# Patient Record
Sex: FEMALE | Race: BLACK OR AFRICAN AMERICAN | NOT HISPANIC OR LATINO | Employment: OTHER | URBAN - METROPOLITAN AREA
[De-identification: names, ages, dates, MRNs, and addresses within clinical notes are randomized per-mention and may not be internally consistent; named-entity substitution may affect disease eponyms.]

---

## 2017-01-13 ENCOUNTER — HISTORICAL (OUTPATIENT)
Dept: RADIOLOGY | Facility: HOSPITAL | Age: 52
End: 2017-01-13

## 2017-03-14 ENCOUNTER — HISTORICAL (OUTPATIENT)
Dept: ADMINISTRATIVE | Facility: HOSPITAL | Age: 52
End: 2017-03-14

## 2017-03-24 ENCOUNTER — HISTORICAL (OUTPATIENT)
Dept: ADMINISTRATIVE | Facility: HOSPITAL | Age: 52
End: 2017-03-24

## 2017-04-13 ENCOUNTER — HISTORICAL (OUTPATIENT)
Dept: ADMINISTRATIVE | Facility: HOSPITAL | Age: 52
End: 2017-04-13

## 2017-05-09 ENCOUNTER — HISTORICAL (OUTPATIENT)
Dept: ADMINISTRATIVE | Facility: HOSPITAL | Age: 52
End: 2017-05-09

## 2017-05-09 LAB
ABS NEUT (OLG): 3.52 X10(3)/MCL (ref 2.1–9.2)
ALBUMIN SERPL-MCNC: 3.8 GM/DL (ref 3.4–5)
ALBUMIN/GLOB SERPL: 1 RATIO (ref 1–2)
ALP SERPL-CCNC: 97 UNIT/L (ref 20–120)
ALT SERPL-CCNC: 20 UNIT/L
AST SERPL-CCNC: 27 UNIT/L
BASOPHILS # BLD AUTO: 0.04 X10(3)/MCL
BASOPHILS NFR BLD AUTO: 1 % (ref 0–1)
BILIRUB SERPL-MCNC: 0.7 MG/DL
BILIRUBIN DIRECT+TOT PNL SERPL-MCNC: 0.1 MG/DL
BILIRUBIN DIRECT+TOT PNL SERPL-MCNC: 0.6 MG/DL
BUN SERPL-MCNC: 16 MG/DL (ref 7–25)
CALCIUM SERPL-MCNC: 9 MG/DL (ref 8.4–10.3)
CHLORIDE SERPL-SCNC: 105 MMOL/L (ref 96–110)
CO2 SERPL-SCNC: 28 MMOL/L (ref 24–32)
CREAT SERPL-MCNC: 0.62 MG/DL (ref 0.7–1.1)
EOSINOPHIL # BLD AUTO: 0.19 X10(3)/MCL
EOSINOPHIL NFR BLD AUTO: 3 % (ref 0–5)
ERYTHROCYTE [DISTWIDTH] IN BLOOD BY AUTOMATED COUNT: 17.4 % (ref 11.5–14.5)
GLOBULIN SER-MCNC: 4.4 GM/ML (ref 2.3–3.5)
GLUCOSE SERPL-MCNC: 91 MG/DL (ref 65–99)
HCT VFR BLD AUTO: 34.8 % (ref 35–46)
HGB BLD-MCNC: 10.4 GM/DL (ref 12–16)
IMM GRANULOCYTES # BLD AUTO: 0.02 10*3/UL
IMM GRANULOCYTES NFR BLD AUTO: 0 %
LYMPHOCYTES # BLD AUTO: 2.62 X10(3)/MCL
LYMPHOCYTES NFR BLD AUTO: 36 % (ref 15–40)
MCH RBC QN AUTO: 19.5 PG (ref 26–34)
MCHC RBC AUTO-ENTMCNC: 29.9 GM/DL (ref 31–37)
MCV RBC AUTO: 65.3 FL (ref 80–100)
MONOCYTES # BLD AUTO: 0.83 X10(3)/MCL
MONOCYTES NFR BLD AUTO: 12 % (ref 4–12)
NEUTROPHILS # BLD AUTO: 3.52 X10(3)/MCL
NEUTROPHILS NFR BLD AUTO: 49 X10(3)/MCL
PLATELET # BLD AUTO: 314 X10(3)/MCL (ref 130–400)
PMV BLD AUTO: 9.3 FL (ref 7.4–10.4)
POTASSIUM SERPL-SCNC: 3.6 MMOL/L (ref 3.6–5.2)
PROT SERPL-MCNC: 8.2 GM/DL (ref 6–8)
RBC # BLD AUTO: 5.33 X10(6)/MCL (ref 4–5.2)
SODIUM SERPL-SCNC: 138 MMOL/L (ref 135–146)
WBC # SPEC AUTO: 7.2 X10(3)/MCL (ref 4.5–11)

## 2017-07-27 LAB — RAPID GROUP A STREP (OHS): NEGATIVE

## 2017-08-09 ENCOUNTER — HISTORICAL (OUTPATIENT)
Dept: ADMINISTRATIVE | Facility: HOSPITAL | Age: 52
End: 2017-08-09

## 2017-08-09 LAB
HBV SURFACE AB SER-ACNC: 334.95 MIU/ML
HBV SURFACE AB SERPL IA-ACNC: REACTIVE M[IU]/ML

## 2017-08-15 ENCOUNTER — HISTORICAL (OUTPATIENT)
Dept: RADIOLOGY | Facility: HOSPITAL | Age: 52
End: 2017-08-15

## 2017-08-23 ENCOUNTER — HISTORICAL (OUTPATIENT)
Dept: RADIOLOGY | Facility: HOSPITAL | Age: 52
End: 2017-08-23

## 2018-05-03 ENCOUNTER — HISTORICAL (OUTPATIENT)
Dept: RADIOLOGY | Facility: HOSPITAL | Age: 53
End: 2018-05-03

## 2019-04-19 ENCOUNTER — HISTORICAL (OUTPATIENT)
Dept: ADMINISTRATIVE | Facility: HOSPITAL | Age: 54
End: 2019-04-19

## 2019-04-26 ENCOUNTER — HISTORICAL (OUTPATIENT)
Dept: ADMINISTRATIVE | Facility: HOSPITAL | Age: 54
End: 2019-04-26

## 2019-04-26 LAB — HIV 1+2 AB+HIV1 P24 AG SERPL QL IA: NONREACTIVE

## 2019-06-13 ENCOUNTER — HISTORICAL (OUTPATIENT)
Dept: LAB | Facility: HOSPITAL | Age: 54
End: 2019-06-13

## 2019-06-13 LAB
ABS NEUT (OLG): 3.26 X10(3)/MCL (ref 2.1–9.2)
ALBUMIN SERPL-MCNC: 3.8 GM/DL (ref 3.4–5)
ALBUMIN/GLOB SERPL: 1 {RATIO}
ALP SERPL-CCNC: 111 UNIT/L (ref 45–117)
ALT SERPL-CCNC: 18 UNIT/L (ref 13–56)
ANISOCYTOSIS BLD QL SMEAR: ABNORMAL
AST SERPL-CCNC: 17 UNIT/L (ref 15–37)
BILIRUB SERPL-MCNC: 0.8 MG/DL (ref 0.2–1)
BILIRUBIN DIRECT+TOT PNL SERPL-MCNC: 0.23 MG/DL (ref 0–0.2)
BILIRUBIN DIRECT+TOT PNL SERPL-MCNC: 0.57 MG/DL (ref 0–1)
BUN SERPL-MCNC: 12 MG/DL (ref 7–18)
CALCIUM SERPL-MCNC: 8.9 MG/DL (ref 8.5–10.1)
CHLORIDE SERPL-SCNC: 108 MMOL/L (ref 98–107)
CHOLEST SERPL-MCNC: 140 MG/DL (ref 0–199)
CHOLEST/HDLC SERPL: 3 MG/DL (ref 0–8)
CO2 SERPL-SCNC: 26 MMOL/L (ref 21–32)
CREAT SERPL-MCNC: 0.84 MG/DL (ref 0.55–1.02)
ERYTHROCYTE [DISTWIDTH] IN BLOOD BY AUTOMATED COUNT: 17.2 % (ref 11.5–17)
GLOBULIN SER-MCNC: 4 GM/DL (ref 2–4)
GLUCOSE SERPL-MCNC: 92 MG/DL (ref 74–106)
HCT VFR BLD AUTO: 39.7 % (ref 37–47)
HDLC SERPL-MCNC: 44 MG/DL
HGB BLD-MCNC: 12.3 GM/DL (ref 12–16)
HYPOCHROMIA BLD QL SMEAR: ABNORMAL
LDLC SERPL CALC-MCNC: 83 MG/DL (ref 0–129)
MCH RBC QN AUTO: 22.4 PG (ref 27–31)
MCHC RBC AUTO-ENTMCNC: 31 GM/DL (ref 33–36)
MCV RBC AUTO: 72.4 FL (ref 80–94)
MICROCYTES BLD QL SMEAR: ABNORMAL
PLATELET # BLD AUTO: 327 X10(3)/MCL (ref 130–400)
PLATELET # BLD EST: ADEQUATE 10*3/UL
PMV BLD AUTO: 9.7 FL (ref 7.4–10.4)
POTASSIUM SERPL-SCNC: 3.8 MMOL/L (ref 3.5–5.1)
PROT SERPL-MCNC: 8.3 GM/DL (ref 6.4–8.2)
RBC # BLD AUTO: 5.48 X10(6)/MCL (ref 4.2–5.4)
RBC MORPH BLD: ABNORMAL
SODIUM SERPL-SCNC: 142 MMOL/L (ref 136–145)
TRIGL SERPL-MCNC: 63 MG/DL (ref 0–149)
TSH SERPL-ACNC: 0.74 MIU/ML (ref 0.36–3.74)
VLDLC SERPL CALC-MCNC: 13 MG/DL
WBC # SPEC AUTO: 7.1 X10(3)/MCL (ref 4.5–11.5)

## 2021-02-19 ENCOUNTER — HISTORICAL (OUTPATIENT)
Dept: LAB | Facility: HOSPITAL | Age: 56
End: 2021-02-19

## 2021-02-19 LAB
ABS NEUT (OLG): 2.58 X10(3)/MCL (ref 2.1–9.2)
ALBUMIN SERPL-MCNC: 4.1 GM/DL (ref 3.5–5)
ALBUMIN/GLOB SERPL: 1.1 RATIO (ref 1.1–2)
ALP SERPL-CCNC: 95 UNIT/L (ref 40–150)
ALT SERPL-CCNC: 10 UNIT/L (ref 0–55)
AST SERPL-CCNC: 15 UNIT/L (ref 5–34)
BASOPHILS # BLD AUTO: 0 X10(3)/MCL (ref 0–0.2)
BASOPHILS NFR BLD AUTO: 0 %
BILIRUB SERPL-MCNC: 0.8 MG/DL
BILIRUBIN DIRECT+TOT PNL SERPL-MCNC: 0.2 MG/DL (ref 0–0.5)
BILIRUBIN DIRECT+TOT PNL SERPL-MCNC: 0.6 MG/DL (ref 0–0.8)
BUN SERPL-MCNC: 13.6 MG/DL (ref 9.8–20.1)
CALCIUM SERPL-MCNC: 9.7 MG/DL (ref 8.4–10.2)
CHLORIDE SERPL-SCNC: 102 MMOL/L (ref 98–107)
CHOLEST SERPL-MCNC: 205 MG/DL
CHOLEST/HDLC SERPL: 3 {RATIO} (ref 0–5)
CO2 SERPL-SCNC: 32 MMOL/L (ref 22–29)
CREAT SERPL-MCNC: 0.74 MG/DL (ref 0.55–1.02)
DEPRECATED CALCIDIOL+CALCIFEROL SERPL-MC: 42.2 NG/ML (ref 30–80)
EOSINOPHIL # BLD AUTO: 0.2 X10(3)/MCL (ref 0–0.9)
EOSINOPHIL NFR BLD AUTO: 3 %
ERYTHROCYTE [DISTWIDTH] IN BLOOD BY AUTOMATED COUNT: 14.9 % (ref 11.5–17)
FERRITIN SERPL-MCNC: 8.7 NG/ML (ref 4.63–204)
GLOBULIN SER-MCNC: 3.7 GM/DL (ref 2.4–3.5)
GLUCOSE SERPL-MCNC: 89 MG/DL (ref 74–100)
HCT VFR BLD AUTO: 45.4 % (ref 37–47)
HDLC SERPL-MCNC: 62 MG/DL (ref 35–60)
HGB BLD-MCNC: 13.7 GM/DL (ref 12–16)
IMM GRANULOCYTES # BLD AUTO: 0.01 % (ref 0–0.02)
IMM GRANULOCYTES NFR BLD AUTO: 0.2 % (ref 0–0.43)
IRON SATN MFR SERPL: 16 % (ref 20–50)
IRON SERPL-MCNC: 59 UG/DL (ref 50–170)
LDLC SERPL CALC-MCNC: 125 MG/DL (ref 50–140)
LYMPHOCYTES # BLD AUTO: 1.9 X10(3)/MCL (ref 0.6–4.6)
LYMPHOCYTES NFR BLD AUTO: 36 %
MCH RBC QN AUTO: 25.9 PG (ref 27–31)
MCHC RBC AUTO-ENTMCNC: 30.2 GM/DL (ref 33–36)
MCV RBC AUTO: 86 FL (ref 80–94)
MONOCYTES # BLD AUTO: 0.6 X10(3)/MCL (ref 0.1–1.3)
MONOCYTES NFR BLD AUTO: 12 %
NEUTROPHILS # BLD AUTO: 2.58 X10(3)/MCL (ref 1.4–7.9)
NEUTROPHILS NFR BLD AUTO: 49 %
PLATELET # BLD AUTO: 281 X10(3)/MCL (ref 130–400)
PMV BLD AUTO: 10 FL (ref 9.4–12.4)
POTASSIUM SERPL-SCNC: 4 MMOL/L (ref 3.5–5.1)
PROT SERPL-MCNC: 7.8 GM/DL (ref 6.4–8.3)
RBC # BLD AUTO: 5.28 X10(6)/MCL (ref 4.2–5.4)
SODIUM SERPL-SCNC: 141 MMOL/L (ref 136–145)
TIBC SERPL-MCNC: 313 UG/DL (ref 70–310)
TIBC SERPL-MCNC: 372 UG/DL (ref 250–450)
TRANSFERRIN SERPL-MCNC: 314 MG/DL (ref 180–382)
TRIGL SERPL-MCNC: 91 MG/DL (ref 37–140)
VIT B12 SERPL-MCNC: 467 PG/ML (ref 213–816)
VLDLC SERPL CALC-MCNC: 18 MG/DL
WBC # SPEC AUTO: 5.3 X10(3)/MCL (ref 4.5–11.5)

## 2021-03-09 ENCOUNTER — HISTORICAL (OUTPATIENT)
Dept: LAB | Facility: HOSPITAL | Age: 56
End: 2021-03-09

## 2021-03-09 LAB
FT4I SERPL CALC-MCNC: 2.83 (ref 2.6–3.6)
T3RU NFR SERPL: 30.7 % (ref 31–39)
T4 SERPL-MCNC: 9.21 UG/DL (ref 4.87–11.72)
TSH SERPL-ACNC: 0.75 UIU/ML (ref 0.35–4.94)

## 2021-04-27 ENCOUNTER — HISTORICAL (OUTPATIENT)
Dept: LAB | Facility: HOSPITAL | Age: 56
End: 2021-04-27

## 2021-04-27 LAB
CALCIUM SERPL-MCNC: 9.5 MG/DL (ref 8.4–10.2)
DEPRECATED CALCIDIOL+CALCIFEROL SERPL-MC: 72.3 NG/ML (ref 30–80)

## 2021-08-02 ENCOUNTER — HISTORICAL (OUTPATIENT)
Dept: ADMINISTRATIVE | Facility: HOSPITAL | Age: 56
End: 2021-08-02

## 2021-08-02 LAB
SARS-COV-2 RNA RESP QL NAA+PROBE: NEGATIVE
SARS-COV-2 RNA RESP QL NAA+PROBE: NOT DETECTED

## 2021-08-03 LAB
ABS NEUT (OLG): 2.77 X10(3)/MCL (ref 2.1–9.2)
BASOPHILS # BLD AUTO: 0 X10(3)/MCL (ref 0–0.2)
BASOPHILS NFR BLD AUTO: 0 %
BUN SERPL-MCNC: 13 MG/DL (ref 9.8–20.1)
CALCIUM SERPL-MCNC: 9.3 MG/DL (ref 8.4–10.2)
CHLORIDE SERPL-SCNC: 103 MMOL/L (ref 98–107)
CO2 SERPL-SCNC: 29 MMOL/L (ref 22–29)
CREAT SERPL-MCNC: 0.73 MG/DL (ref 0.55–1.02)
CREAT/UREA NIT SERPL: 18
EOSINOPHIL # BLD AUTO: 0.1 X10(3)/MCL (ref 0–0.9)
EOSINOPHIL NFR BLD AUTO: 2 %
ERYTHROCYTE [DISTWIDTH] IN BLOOD BY AUTOMATED COUNT: 15.6 % (ref 11.5–17)
GLUCOSE SERPL-MCNC: 92 MG/DL (ref 74–100)
HCT VFR BLD AUTO: 42.7 % (ref 37–47)
HGB BLD-MCNC: 13.1 GM/DL (ref 12–16)
LYMPHOCYTES # BLD AUTO: 2.6 X10(3)/MCL (ref 0.6–4.6)
LYMPHOCYTES NFR BLD AUTO: 42 %
MCH RBC QN AUTO: 25.9 PG (ref 27–31)
MCHC RBC AUTO-ENTMCNC: 30.7 GM/DL (ref 33–36)
MCV RBC AUTO: 84.4 FL (ref 80–94)
MONOCYTES # BLD AUTO: 0.6 X10(3)/MCL (ref 0.1–1.3)
MONOCYTES NFR BLD AUTO: 10 %
NEUTROPHILS # BLD AUTO: 2.77 X10(3)/MCL (ref 2.1–9.2)
NEUTROPHILS NFR BLD AUTO: 45 %
PLATELET # BLD AUTO: 290 X10(3)/MCL (ref 130–400)
PMV BLD AUTO: 10.6 FL (ref 9.4–12.4)
POTASSIUM SERPL-SCNC: 3.9 MMOL/L (ref 3.5–5.1)
RBC # BLD AUTO: 5.06 X10(6)/MCL (ref 4.2–5.4)
SODIUM SERPL-SCNC: 144 MMOL/L (ref 136–145)
WBC # SPEC AUTO: 6.2 X10(3)/MCL (ref 4.5–11.5)

## 2021-08-04 ENCOUNTER — HISTORICAL (OUTPATIENT)
Dept: SURGERY | Facility: HOSPITAL | Age: 56
End: 2021-08-04

## 2021-11-30 ENCOUNTER — HISTORICAL (OUTPATIENT)
Dept: RADIOLOGY | Facility: HOSPITAL | Age: 56
End: 2021-11-30

## 2022-04-12 ENCOUNTER — HISTORICAL (OUTPATIENT)
Dept: ADMINISTRATIVE | Facility: HOSPITAL | Age: 57
End: 2022-04-12

## 2022-04-30 VITALS
WEIGHT: 180.31 LBS | HEIGHT: 65 IN | DIASTOLIC BLOOD PRESSURE: 88 MMHG | SYSTOLIC BLOOD PRESSURE: 144 MMHG | BODY MASS INDEX: 30.04 KG/M2 | OXYGEN SATURATION: 100 %

## 2022-09-22 ENCOUNTER — HISTORICAL (OUTPATIENT)
Dept: ADMINISTRATIVE | Facility: HOSPITAL | Age: 57
End: 2022-09-22

## 2022-10-17 ENCOUNTER — APPOINTMENT (OUTPATIENT)
Dept: LAB | Facility: HOSPITAL | Age: 57
End: 2022-10-17
Attending: INTERNAL MEDICINE
Payer: MEDICARE

## 2022-10-20 DIAGNOSIS — M25.532 LEFT WRIST PAIN: Primary | ICD-10-CM

## 2022-10-24 ENCOUNTER — HOSPITAL ENCOUNTER (OUTPATIENT)
Dept: RADIOLOGY | Facility: CLINIC | Age: 57
Discharge: HOME | End: 2022-10-24
Attending: STUDENT IN AN ORGANIZED HEALTH CARE EDUCATION/TRAINING PROGRAM
Payer: MEDICARE

## 2022-10-24 ENCOUNTER — OFFICE VISIT (OUTPATIENT)
Dept: ORTHOPEDICS | Facility: CLINIC | Age: 57
End: 2022-10-24
Payer: MEDICARE

## 2022-10-24 VITALS
HEART RATE: 69 BPM | HEIGHT: 64 IN | SYSTOLIC BLOOD PRESSURE: 135 MMHG | WEIGHT: 194.38 LBS | BODY MASS INDEX: 33.19 KG/M2 | DIASTOLIC BLOOD PRESSURE: 83 MMHG

## 2022-10-24 DIAGNOSIS — M25.532 LEFT WRIST PAIN: ICD-10-CM

## 2022-10-24 DIAGNOSIS — G56.02 LEFT CARPAL TUNNEL SYNDROME: ICD-10-CM

## 2022-10-24 DIAGNOSIS — M65.312 TRIGGER THUMB OF LEFT HAND: ICD-10-CM

## 2022-10-24 DIAGNOSIS — M65.4 DE QUERVAIN'S TENOSYNOVITIS, LEFT: ICD-10-CM

## 2022-10-24 DIAGNOSIS — M25.532 LEFT WRIST PAIN: Primary | ICD-10-CM

## 2022-10-24 PROCEDURE — 99203 OFFICE O/P NEW LOW 30 MIN: CPT | Mod: 25,,, | Performed by: STUDENT IN AN ORGANIZED HEALTH CARE EDUCATION/TRAINING PROGRAM

## 2022-10-24 PROCEDURE — 73110 XR WRIST COMPLETE 3 VIEWS LEFT: ICD-10-PCS | Mod: LT,,, | Performed by: STUDENT IN AN ORGANIZED HEALTH CARE EDUCATION/TRAINING PROGRAM

## 2022-10-24 PROCEDURE — 20550 TENDON SHEATH: ICD-10-PCS | Mod: LT,,, | Performed by: STUDENT IN AN ORGANIZED HEALTH CARE EDUCATION/TRAINING PROGRAM

## 2022-10-24 PROCEDURE — 20550 NJX 1 TENDON SHEATH/LIGAMENT: CPT | Mod: LT,,, | Performed by: STUDENT IN AN ORGANIZED HEALTH CARE EDUCATION/TRAINING PROGRAM

## 2022-10-24 PROCEDURE — 73110 X-RAY EXAM OF WRIST: CPT | Mod: LT,,, | Performed by: STUDENT IN AN ORGANIZED HEALTH CARE EDUCATION/TRAINING PROGRAM

## 2022-10-24 PROCEDURE — 99203 PR OFFICE/OUTPT VISIT, NEW, LEVL III, 30-44 MIN: ICD-10-PCS | Mod: 25,,, | Performed by: STUDENT IN AN ORGANIZED HEALTH CARE EDUCATION/TRAINING PROGRAM

## 2022-10-24 RX ORDER — FLUTICASONE PROPIONATE 50 MCG
SPRAY, SUSPENSION (ML) NASAL
COMMUNITY
Start: 2022-06-24

## 2022-10-24 RX ORDER — OMEPRAZOLE 20 MG/1
20 CAPSULE, DELAYED RELEASE ORAL
COMMUNITY

## 2022-10-24 RX ORDER — LOSARTAN POTASSIUM AND HYDROCHLOROTHIAZIDE 12.5; 5 MG/1; MG/1
TABLET ORAL
COMMUNITY
Start: 2022-10-04

## 2022-10-24 RX ORDER — PHENTERMINE HYDROCHLORIDE 37.5 MG/1
37.5 TABLET ORAL
COMMUNITY
Start: 2022-09-14 | End: 2022-12-13

## 2022-10-24 RX ORDER — HYDROCODONE BITARTRATE AND ACETAMINOPHEN 10; 325 MG/1; MG/1
TABLET ORAL
COMMUNITY
Start: 2022-10-04

## 2022-10-24 RX ORDER — LIDOCAINE HYDROCHLORIDE 10 MG/ML
1 INJECTION INFILTRATION; PERINEURAL
Status: DISCONTINUED | OUTPATIENT
Start: 2022-10-24 | End: 2022-10-24 | Stop reason: HOSPADM

## 2022-10-24 RX ORDER — DICLOFENAC SODIUM 10 MG/G
GEL TOPICAL
COMMUNITY
Start: 2022-09-13

## 2022-10-24 RX ORDER — LANOLIN ALCOHOL/MO/W.PET/CERES
1000 CREAM (GRAM) TOPICAL
COMMUNITY

## 2022-10-24 RX ORDER — FAMOTIDINE 40 MG/1
40 TABLET, FILM COATED ORAL
COMMUNITY
Start: 2021-08-01

## 2022-10-24 RX ORDER — CETIRIZINE HYDROCHLORIDE 10 MG/1
10 TABLET ORAL DAILY
COMMUNITY
Start: 2022-06-24

## 2022-10-24 RX ORDER — BETAMETHASONE SODIUM PHOSPHATE AND BETAMETHASONE ACETATE 3; 3 MG/ML; MG/ML
6 INJECTION, SUSPENSION INTRA-ARTICULAR; INTRALESIONAL; INTRAMUSCULAR; SOFT TISSUE
Status: DISCONTINUED | OUTPATIENT
Start: 2022-10-24 | End: 2022-10-24 | Stop reason: HOSPADM

## 2022-10-24 RX ADMIN — LIDOCAINE HYDROCHLORIDE 1 ML: 10 INJECTION INFILTRATION; PERINEURAL at 02:10

## 2022-10-24 RX ADMIN — BETAMETHASONE SODIUM PHOSPHATE AND BETAMETHASONE ACETATE 6 MG: 3; 3 INJECTION, SUSPENSION INTRA-ARTICULAR; INTRALESIONAL; INTRAMUSCULAR; SOFT TISSUE at 02:10

## 2022-10-24 NOTE — PROCEDURES
Tendon Sheath    Date/Time: 10/24/2022 2:00 PM  Performed by: Dimas Mejia MD  Authorized by: Dimas Mejia MD     Consent Done?:  Yes (Verbal)  Indications:  Pain  Timeout: prior to procedure the correct patient, procedure, and site was verified    Location:  Thumb  Site:  L thumb flexor tendon sheath  Ultrasonic guidance for needle placement?: No    Needle size:  25 G  Approach:  Volar  Medications:  1 mL LIDOcaine HCL 10 mg/ml (1%) 10 mg/mL (1 %)  Patient tolerance:  Patient tolerated the procedure well with no immediate complications  Tendon Sheath    Date/Time: 10/24/2022 2:00 PM  Performed by: Dimas Mejia MD  Authorized by: Dimas Mejia MD     Consent Done?:  Yes (Verbal)  Indications:  Pain  Timeout: prior to procedure the correct patient, procedure, and site was verified    Location:  Wrist  Site:  L first doral compartment  Needle size:  25 G  Approach:  Radial  Medications:  1 mL LIDOcaine HCL 10 mg/ml (1%) 10 mg/mL (1 %); 6 mg betamethasone acetate-betamethasone sodium phosphate 6 mg/mL  Patient tolerance:  Patient tolerated the procedure well with no immediate complications

## 2022-10-24 NOTE — PROGRESS NOTES
Chief Complaint:  Left wrist and thumb pain    Consulting Physician: Juan Luis Alford, DO    History of present illness:    Patient is a 57-year-old female on disability here for initial evaluation for left wrist and thumb pain.  She states she has had this pain for 2 months.  She was seen in urgent care where she was given a wrist brace which did not help her.  She states that is worse at nighttime.  She also has numbness and tingling in the thumb index and middle fingers.  She states that is when she tries to bend the thumb sometimes it locks and catches on her.  She also has pain with wrist motion.  She localizes the wrist pain to the radial styloid.  It occasionally radiates proximally and distally.  She has never had an injection for these.  She denies any weakness or clumsiness in the hand.    Past Medical History:   Diagnosis Date    Hypertension        History reviewed. No pertinent surgical history.    Current Outpatient Medications   Medication Sig    cetirizine (ZYRTEC) 10 MG tablet Take 10 mg by mouth once daily.    cyanocobalamin (VITAMIN B-12) 1000 MCG tablet Take 1,000 mcg by mouth.    diclofenac sodium (VOLTAREN) 1 % Gel     famotidine (PEPCID) 40 MG tablet Take 40 mg by mouth.    fluticasone propionate (FLONASE) 50 mcg/actuation nasal spray INSTILL ONE SPRAY INTO EACH NOSTRIL ONCE A DAY    HYDROcodone-acetaminophen (NORCO)  mg per tablet     losartan-hydrochlorothiazide 50-12.5 mg (HYZAAR) 50-12.5 mg per tablet     omeprazole (PRILOSEC) 20 MG capsule Take 20 mg by mouth.    phentermine (ADIPEX-P) 37.5 mg tablet Take 37.5 mg by mouth.     No current facility-administered medications for this visit.       Review of patient's allergies indicates:   Allergen Reactions    Fig Swelling       Family History   Problem Relation Age of Onset    No Known Problems Mother     No Known Problems Father     No Known Problems Sister     No Known Problems Brother     No Known Problems Maternal Aunt     No  "Known Problems Maternal Uncle     No Known Problems Paternal Aunt     No Known Problems Paternal Uncle     No Known Problems Maternal Grandmother     No Known Problems Maternal Grandfather     No Known Problems Paternal Grandmother     No Known Problems Paternal Grandfather     No Known Problems Other     Anesthesia problems Neg Hx     Broken bones Neg Hx     Cancer Neg Hx     Clotting disorder Neg Hx     Collagen disease Neg Hx     Diabetes Neg Hx     Dislocations Neg Hx     Osteoporosis Neg Hx     Rheumatologic disease Neg Hx     Scoliosis Neg Hx     Severe sprains Neg Hx        Social History     Socioeconomic History    Marital status:    Tobacco Use    Smoking status: Never    Smokeless tobacco: Never   Substance and Sexual Activity    Alcohol use: Never    Drug use: Never       Review of Systems:    Constitution:   Denies chills, fever, and sweats.  HENT:   Denies headaches or blurry vision.  Cardiovascular:  Denies chest pain or irregular heart beat.  Respiratory:   Denies cough or shortness of breath.  Gastrointestinal:  Denies abdominal pain, nausea, or vomiting.  Musculoskeletal:   Denies muscle cramps.  Neurological:   Denies dizziness or focal weakness.  Psychiatric/Behavior: Normal mental status.  Hematology/Lymph:  Denies bleeding problem or easy bruising/bleeding.  Skin:    Denies rash or suspicious lesions.    Examination:    Vital Signs:    Vitals:    10/24/22 1348 10/24/22 1349   BP: 135/83    Pulse: 69    Weight: 88.2 kg (194 lb 6.4 oz)    Height: 5' 4" (1.626 m)    PainSc: 10-Worst pain ever 10-Worst pain ever       Body mass index is 33.37 kg/m².    Constitution:   Well-developed, well nourished patient in no acute distress.  Neurological:   Alert and oriented x 3 and cooperative to examination.     Psychiatric/Behavior: Normal mental status.  Respiratory:   No shortness of breath.  Eyes:    Extraoccular muscles intact  Skin:    No scars, rash or suspicious lesions.    MSK:   Left hand " and wrist:  No open wounds or rashes.  Swelling over the radial styloid.  Tenderness to palpation of radial styloid.  Finkelstein's test is positive.  Tenderness to palpation over the A1 pulley of the thumb.  She has full passive range of motion of the thumb but on active range of motion there is a catch.  She has diminished sensation in the thumb index and long fingers compared to the ring and small fingers.  Tinel over the median nerve at the carpal tunnel is positive.  Durkan's test is positive.  Sensation light touch intact to ulnar and radial distributions.  Hand is warm well perfused    Imaging:   X-ray of the left hand shows degenerative changes but no fractures or dislocations     Assessment:  Left carpal tunnel syndrome, left de Quervain tenosynovitis, left trigger thumb    Plan:  Regarding the left de Quervain tenosynovitis and left trigger thumb, I will treat this conservatively.  I will give her injections into the tendon sheaths of both of these today.  For the left carpal tunnel syndrome, I will order a EMG and nerve conduction study to better evaluate for compressive neuropathy at the elbow and wrist.    Follow Up:  After EMG  Xray at next visit:  None

## 2023-10-26 ENCOUNTER — OFFICE VISIT (OUTPATIENT)
Dept: URGENT CARE | Facility: CLINIC | Age: 58
End: 2023-10-26
Payer: MEDICARE

## 2023-10-26 VITALS
BODY MASS INDEX: 33 KG/M2 | TEMPERATURE: 98 F | WEIGHT: 193.31 LBS | RESPIRATION RATE: 18 BRPM | HEIGHT: 64 IN | HEART RATE: 60 BPM | SYSTOLIC BLOOD PRESSURE: 124 MMHG | OXYGEN SATURATION: 100 % | DIASTOLIC BLOOD PRESSURE: 84 MMHG

## 2023-10-26 DIAGNOSIS — R10.9 FLANK PAIN: ICD-10-CM

## 2023-10-26 DIAGNOSIS — M54.50 ACUTE LEFT-SIDED LOW BACK PAIN WITHOUT SCIATICA: Primary | ICD-10-CM

## 2023-10-26 LAB
BILIRUB UR QL STRIP: NEGATIVE
GLUCOSE UR QL STRIP: NEGATIVE
KETONES UR QL STRIP: POSITIVE
LEUKOCYTE ESTERASE UR QL STRIP: NEGATIVE
PH, POC UA: 7
POC BLOOD, URINE: NEGATIVE
POC NITRATES, URINE: NEGATIVE
PROT UR QL STRIP: NEGATIVE
SP GR UR STRIP: 1.02 (ref 1–1.03)
UROBILINOGEN UR STRIP-ACNC: 1 (ref 0.1–1.1)

## 2023-10-26 PROCEDURE — 99215 OFFICE O/P EST HI 40 MIN: CPT | Mod: PBBFAC | Performed by: STUDENT IN AN ORGANIZED HEALTH CARE EDUCATION/TRAINING PROGRAM

## 2023-10-26 PROCEDURE — 81003 URINALYSIS AUTO W/O SCOPE: CPT | Mod: PBBFAC | Performed by: STUDENT IN AN ORGANIZED HEALTH CARE EDUCATION/TRAINING PROGRAM

## 2023-10-26 PROCEDURE — 99214 OFFICE O/P EST MOD 30 MIN: CPT | Mod: S$PBB,,, | Performed by: STUDENT IN AN ORGANIZED HEALTH CARE EDUCATION/TRAINING PROGRAM

## 2023-10-26 PROCEDURE — 63600175 PHARM REV CODE 636 W HCPCS: Performed by: STUDENT IN AN ORGANIZED HEALTH CARE EDUCATION/TRAINING PROGRAM

## 2023-10-26 PROCEDURE — 99214 PR OFFICE/OUTPT VISIT, EST, LEVL IV, 30-39 MIN: ICD-10-PCS | Mod: S$PBB,,, | Performed by: STUDENT IN AN ORGANIZED HEALTH CARE EDUCATION/TRAINING PROGRAM

## 2023-10-26 RX ORDER — FAMOTIDINE 20 MG/1
20 TABLET, FILM COATED ORAL 2 TIMES DAILY PRN
COMMUNITY

## 2023-10-26 RX ORDER — ALPRAZOLAM 1 MG/1
1 TABLET ORAL NIGHTLY
COMMUNITY
Start: 2023-10-09

## 2023-10-26 RX ORDER — DEXAMETHASONE SODIUM PHOSPHATE 4 MG/ML
4 INJECTION, SOLUTION INTRA-ARTICULAR; INTRALESIONAL; INTRAMUSCULAR; INTRAVENOUS; SOFT TISSUE
Status: COMPLETED | OUTPATIENT
Start: 2023-10-26 | End: 2023-10-26

## 2023-10-26 RX ORDER — CYCLOBENZAPRINE HCL 5 MG
5 TABLET ORAL 3 TIMES DAILY PRN
Qty: 10 TABLET | Refills: 0 | Status: SHIPPED | OUTPATIENT
Start: 2023-10-26 | End: 2023-11-05

## 2023-10-26 RX ORDER — LIDOCAINE 50 MG/G
1 PATCH TOPICAL DAILY
Qty: 10 PATCH | Refills: 0 | Status: SHIPPED | OUTPATIENT
Start: 2023-10-26

## 2023-10-26 RX ORDER — CYCLOSPORINE 0.5 MG/ML
1 EMULSION OPHTHALMIC DAILY
COMMUNITY
Start: 2023-05-02

## 2023-10-26 RX ADMIN — DEXAMETHASONE SODIUM PHOSPHATE 4 MG: 4 INJECTION, SOLUTION INTRA-ARTICULAR; INTRALESIONAL; INTRAMUSCULAR; INTRAVENOUS; SOFT TISSUE at 02:10

## 2023-10-26 NOTE — PROGRESS NOTES
"Subjective:      Patient ID: Renetta Cornejo is a 58 y.o. female.    Vitals:  height is 5' 4" (1.626 m) and weight is 87.7 kg (193 lb 5.5 oz). Her oral temperature is 98 °F (36.7 °C). Her blood pressure is 124/84 and her pulse is 60. Her respiration is 18 and oxygen saturation is 100%.     Chief Complaint: Flank Pain (Left side x 2 weeks)    Flank Pain    Renetta Cornejo is a 58-year-old female presenting to the urgent care clinic with left-sided back pain that has been going on for about 2 weeks.  Patient states that the pain is located on the left side of her back, and does not really radiate down the buttock or lower extremity.  Patient describes the pain as a throbbing/aching pain and rates it a 9/10 in intensity.  She has tried Tylenol over-the-counter with minimal relief of her symptoms.  Patient has had a prior laparoscopic sleeve gastrectomy, does not take any NSAIDs.  Patient denies any red flag symptoms at this time.    Genitourinary:  Positive for flank pain.      Objective:     Physical Exam   Constitutional: She is oriented to person, place, and time. She appears well-developed. She is cooperative. No distress.   HENT:   Head: Normocephalic and atraumatic.   Nose: Nose normal.   Mouth/Throat: Oropharynx is clear and moist and mucous membranes are normal.   Eyes: Conjunctivae and lids are normal.   Neck: Trachea normal and phonation normal. Neck supple.   Cardiovascular: Normal rate, regular rhythm, normal heart sounds and normal pulses.   Pulmonary/Chest: Effort normal and breath sounds normal.   Abdominal: Normal appearance and bowel sounds are normal. She exhibits no mass. Soft.   Musculoskeletal: Normal range of motion.         General: Tenderness (left sided paraspinal muscle TTP) present. No deformity. Normal range of motion.   Neurological: She is alert and oriented to person, place, and time. She has normal strength and normal reflexes. No sensory deficit.   Skin: Skin is warm, dry, " intact and not diaphoretic.   Psychiatric: Her speech is normal and behavior is normal. Judgment and thought content normal.   Nursing note and vitals reviewed.      Assessment:     1. Acute left-sided low back pain without sciatica    2. Flank pain        Plan:       Acute left-sided low back pain without sciatica  -     dexAMETHasone injection 4 mg  -     cyclobenzaprine (FLEXERIL) 5 MG tablet; Take 1 tablet (5 mg total) by mouth 3 (three) times daily as needed for Muscle spasms.  Dispense: 10 tablet; Refill: 0  -     LIDOcaine (LIDODERM) 5 %; Place 1 patch onto the skin once daily. Remove & Discard patch within 12 hours or as directed by MD  Dispense: 10 patch; Refill: 0    Flank pain  -     POCT Urinalysis, Dipstick, Automated, W/O Scope        This note is dictated using the M*Modal Fluency Direct word recognition program. There are word recognition mistakes that are occasionally missed on review.    Kumar Smith MD

## 2023-12-13 ENCOUNTER — OFFICE VISIT (OUTPATIENT)
Dept: URGENT CARE | Facility: CLINIC | Age: 58
End: 2023-12-13
Payer: MEDICARE

## 2023-12-13 ENCOUNTER — HOSPITAL ENCOUNTER (OUTPATIENT)
Dept: RADIOLOGY | Facility: HOSPITAL | Age: 58
Discharge: HOME | End: 2023-12-13
Payer: MEDICARE

## 2023-12-13 VITALS
HEIGHT: 65 IN | WEIGHT: 190 LBS | RESPIRATION RATE: 18 BRPM | BODY MASS INDEX: 31.65 KG/M2 | DIASTOLIC BLOOD PRESSURE: 75 MMHG | HEART RATE: 66 BPM | TEMPERATURE: 99 F | OXYGEN SATURATION: 100 % | SYSTOLIC BLOOD PRESSURE: 123 MMHG

## 2023-12-13 DIAGNOSIS — R22.41 LOCALIZED SWELLING OF RIGHT FOOT: Primary | ICD-10-CM

## 2023-12-13 DIAGNOSIS — R22.41 LOCALIZED SWELLING OF RIGHT FOOT: ICD-10-CM

## 2023-12-13 PROCEDURE — 99213 OFFICE O/P EST LOW 20 MIN: CPT | Mod: S$PBB,,,

## 2023-12-13 PROCEDURE — 99214 OFFICE O/P EST MOD 30 MIN: CPT | Mod: PBBFAC

## 2023-12-13 PROCEDURE — 99213 PR OFFICE/OUTPT VISIT, EST, LEVL III, 20-29 MIN: ICD-10-PCS | Mod: S$PBB,,,

## 2023-12-13 PROCEDURE — 63600175 PHARM REV CODE 636 W HCPCS

## 2023-12-13 PROCEDURE — 73630 X-RAY EXAM OF FOOT: CPT | Mod: TC,RT

## 2023-12-13 RX ORDER — KETOROLAC TROMETHAMINE 30 MG/ML
30 INJECTION, SOLUTION INTRAMUSCULAR; INTRAVENOUS
Status: COMPLETED | OUTPATIENT
Start: 2023-12-13 | End: 2023-12-13

## 2023-12-13 RX ADMIN — KETOROLAC TROMETHAMINE 30 MG: 30 INJECTION INTRAMUSCULAR; INTRAVENOUS at 04:12

## 2023-12-13 NOTE — PROGRESS NOTES
"Subjective:       Patient ID: Renetta Cornejo is a 58 y.o. female.    Vitals:  height is 5' 5" (1.651 m) and weight is 86.2 kg (190 lb). Her oral temperature is 98.5 °F (36.9 °C). Her blood pressure is 123/75 and her pulse is 66. Her respiration is 18 and oxygen saturation is 100%.     Chief Complaint: Foot Pain (Patient reports aching pain and swelling on top of R foot and toes. Patient reports no injury and is able to walk on it.  )    57 y/o presents to to clinic with right foot pain and swelling x 3 days. Denies injury or fall, denies gout or arthritis. No relief with Tylenol.     Foot Pain  This is a new problem. Associated symptoms include arthralgias and joint swelling. Pertinent negatives include no myalgias or rash.       Musculoskeletal:  Positive for joint pain and joint swelling. Negative for trauma, abnormal ROM of joint and muscle ache.   Skin:  Negative for rash.       Objective:      Physical Exam   Constitutional: She is oriented to person, place, and time. She is cooperative. awake  HENT:   Head: Normocephalic and atraumatic.   Eyes: Conjunctivae are normal.   Pulmonary/Chest: Effort normal.   Abdominal: Normal appearance.   Musculoskeletal:         General: Swelling and deformity present.      Right lower leg: No edema.      Left foot: Normal range of motion and normal capillary refill. Swelling and deformity present. No tenderness, bony tenderness, crepitus or bunion.        Feet:       Comments: Right great toe Bunion    Neurological: She is alert and oriented to person, place, and time.   Skin: Skin is warm and dry. Capillary refill takes less than 2 seconds.   Psychiatric: Her behavior is normal. Mood normal.   Nursing note and vitals reviewed.        Assessment:       1. Localized swelling of right foot          Plan:     Xrays shows bunion, no acute changes, will contact patient if radiologist if interprets any acute findings. Pad your shoes well and purchase shoe inserts. May elevate " and ice extremity. Follow up with PCP to possibly get an ortho referral if symptoms worsens. Ace compression applied. Neurovascular intact.     Localized swelling of right foot  -     XR FOOT COMPLETE 3 VIEW RIGHT; Future; Expected date: 12/13/2023

## 2024-04-10 DIAGNOSIS — M67.911 ROTATOR CUFF DISORDER, RIGHT: Primary | ICD-10-CM

## 2024-04-17 ENCOUNTER — HOSPITAL ENCOUNTER (OUTPATIENT)
Dept: RADIOLOGY | Facility: CLINIC | Age: 59
Discharge: HOME | End: 2024-04-17
Attending: REHABILITATION UNIT
Payer: MEDICARE

## 2024-04-17 ENCOUNTER — OFFICE VISIT (OUTPATIENT)
Dept: ORTHOPEDICS | Facility: CLINIC | Age: 59
End: 2024-04-17
Payer: MEDICARE

## 2024-04-17 VITALS
WEIGHT: 196 LBS | HEIGHT: 65 IN | HEART RATE: 59 BPM | SYSTOLIC BLOOD PRESSURE: 143 MMHG | BODY MASS INDEX: 32.65 KG/M2 | DIASTOLIC BLOOD PRESSURE: 81 MMHG

## 2024-04-17 DIAGNOSIS — M25.511 RIGHT SHOULDER PAIN, UNSPECIFIED CHRONICITY: Primary | ICD-10-CM

## 2024-04-17 DIAGNOSIS — M25.511 RIGHT SHOULDER PAIN, UNSPECIFIED CHRONICITY: ICD-10-CM

## 2024-04-17 PROCEDURE — 99203 OFFICE O/P NEW LOW 30 MIN: CPT | Mod: ,,, | Performed by: REHABILITATION UNIT

## 2024-04-17 PROCEDURE — 73030 X-RAY EXAM OF SHOULDER: CPT | Mod: RT,,, | Performed by: REHABILITATION UNIT

## 2024-04-17 PROCEDURE — 3077F SYST BP >= 140 MM HG: CPT | Mod: CPTII,,, | Performed by: REHABILITATION UNIT

## 2024-04-17 PROCEDURE — 3008F BODY MASS INDEX DOCD: CPT | Mod: CPTII,,, | Performed by: REHABILITATION UNIT

## 2024-04-17 PROCEDURE — 3079F DIAST BP 80-89 MM HG: CPT | Mod: CPTII,,, | Performed by: REHABILITATION UNIT

## 2024-04-17 PROCEDURE — 1159F MED LIST DOCD IN RCRD: CPT | Mod: CPTII,,, | Performed by: REHABILITATION UNIT

## 2024-04-17 RX ORDER — ACETAMINOPHEN 325 MG/1
325 TABLET ORAL EVERY 6 HOURS PRN
COMMUNITY
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
## 2024-06-25 ENCOUNTER — HOSPITAL ENCOUNTER (INPATIENT)
Facility: HOSPITAL | Age: 59
LOS: 4 days | Discharge: HOME | DRG: 445 | End: 2024-06-29
Attending: EMERGENCY MEDICINE | Admitting: INTERNAL MEDICINE
Payer: MEDICARE

## 2024-06-25 DIAGNOSIS — R07.9 CHEST PAIN: Primary | ICD-10-CM

## 2024-06-25 DIAGNOSIS — R10.11 RIGHT UPPER QUADRANT ABDOMINAL PAIN: ICD-10-CM

## 2024-06-25 DIAGNOSIS — R07.9 CHEST PAIN, UNSPECIFIED TYPE: ICD-10-CM

## 2024-06-25 DIAGNOSIS — K80.50 CHOLEDOCHOLITHIASIS: ICD-10-CM

## 2024-06-25 LAB
ALBUMIN SERPL-MCNC: 3.9 G/DL (ref 3.5–5)
ALBUMIN/GLOB SERPL: 1.1 RATIO (ref 1.1–2)
ALP SERPL-CCNC: 136 UNIT/L (ref 40–150)
ALT SERPL-CCNC: 26 UNIT/L (ref 0–55)
ANION GAP SERPL CALC-SCNC: 9 MEQ/L
AST SERPL-CCNC: 64 UNIT/L (ref 5–34)
BASOPHILS # BLD AUTO: 0.02 X10(3)/MCL
BASOPHILS NFR BLD AUTO: 0.3 %
BILIRUB SERPL-MCNC: 0.8 MG/DL
BNP BLD-MCNC: 12.4 PG/ML
BUN SERPL-MCNC: 11.5 MG/DL (ref 9.8–20.1)
CALCIUM SERPL-MCNC: 9.6 MG/DL (ref 8.4–10.2)
CHLORIDE SERPL-SCNC: 106 MMOL/L (ref 98–107)
CO2 SERPL-SCNC: 24 MMOL/L (ref 22–29)
CREAT SERPL-MCNC: 0.71 MG/DL (ref 0.55–1.02)
CREAT/UREA NIT SERPL: 16
EOSINOPHIL # BLD AUTO: 0.13 X10(3)/MCL (ref 0–0.9)
EOSINOPHIL NFR BLD AUTO: 2 %
ERYTHROCYTE [DISTWIDTH] IN BLOOD BY AUTOMATED COUNT: 16.6 % (ref 11.5–17)
GFR SERPLBLD CREATININE-BSD FMLA CKD-EPI: >60 ML/MIN/1.73/M2
GLOBULIN SER-MCNC: 3.5 GM/DL (ref 2.4–3.5)
GLUCOSE SERPL-MCNC: 106 MG/DL (ref 74–100)
HCT VFR BLD AUTO: 39.1 % (ref 37–47)
HGB BLD-MCNC: 12.7 G/DL (ref 12–16)
IMM GRANULOCYTES # BLD AUTO: 0.01 X10(3)/MCL (ref 0–0.04)
IMM GRANULOCYTES NFR BLD AUTO: 0.2 %
LYMPHOCYTES # BLD AUTO: 2.22 X10(3)/MCL (ref 0.6–4.6)
LYMPHOCYTES NFR BLD AUTO: 33.7 %
MCH RBC QN AUTO: 25.6 PG (ref 27–31)
MCHC RBC AUTO-ENTMCNC: 32.5 G/DL (ref 33–36)
MCV RBC AUTO: 78.8 FL (ref 80–94)
MONOCYTES # BLD AUTO: 0.84 X10(3)/MCL (ref 0.1–1.3)
MONOCYTES NFR BLD AUTO: 12.8 %
NEUTROPHILS # BLD AUTO: 3.36 X10(3)/MCL (ref 2.1–9.2)
NEUTROPHILS NFR BLD AUTO: 51 %
NRBC BLD AUTO-RTO: 0 %
PLATELET # BLD AUTO: 216 X10(3)/MCL (ref 130–400)
PMV BLD AUTO: 9.9 FL (ref 7.4–10.4)
POTASSIUM SERPL-SCNC: 3.6 MMOL/L (ref 3.5–5.1)
PROT SERPL-MCNC: 7.4 GM/DL (ref 6.4–8.3)
RBC # BLD AUTO: 4.96 X10(6)/MCL (ref 4.2–5.4)
SODIUM SERPL-SCNC: 139 MMOL/L (ref 136–145)
TROPONIN I SERPL-MCNC: <0.01 NG/ML (ref 0–0.04)
WBC # BLD AUTO: 6.58 X10(3)/MCL (ref 4.5–11.5)

## 2024-06-25 PROCEDURE — 93005 ELECTROCARDIOGRAM TRACING: CPT

## 2024-06-25 PROCEDURE — 93010 ELECTROCARDIOGRAM REPORT: CPT | Mod: 76,,, | Performed by: INTERNAL MEDICINE

## 2024-06-25 PROCEDURE — 83880 ASSAY OF NATRIURETIC PEPTIDE: CPT | Performed by: PHYSICIAN ASSISTANT

## 2024-06-25 PROCEDURE — 84484 ASSAY OF TROPONIN QUANT: CPT

## 2024-06-25 PROCEDURE — 25000242 PHARM REV CODE 250 ALT 637 W/ HCPCS: Performed by: EMERGENCY MEDICINE

## 2024-06-25 PROCEDURE — 85025 COMPLETE CBC W/AUTO DIFF WBC: CPT | Performed by: PHYSICIAN ASSISTANT

## 2024-06-25 PROCEDURE — 25500020 PHARM REV CODE 255: Performed by: EMERGENCY MEDICINE

## 2024-06-25 PROCEDURE — 25000003 PHARM REV CODE 250: Performed by: EMERGENCY MEDICINE

## 2024-06-25 PROCEDURE — 11000001 HC ACUTE MED/SURG PRIVATE ROOM

## 2024-06-25 PROCEDURE — 80053 COMPREHEN METABOLIC PANEL: CPT | Performed by: PHYSICIAN ASSISTANT

## 2024-06-25 PROCEDURE — 99285 EMERGENCY DEPT VISIT HI MDM: CPT | Mod: 25

## 2024-06-25 PROCEDURE — 93010 ELECTROCARDIOGRAM REPORT: CPT | Mod: ,,, | Performed by: INTERNAL MEDICINE

## 2024-06-25 PROCEDURE — 63600175 PHARM REV CODE 636 W HCPCS

## 2024-06-25 PROCEDURE — 84484 ASSAY OF TROPONIN QUANT: CPT | Performed by: PHYSICIAN ASSISTANT

## 2024-06-25 RX ORDER — ACETAMINOPHEN 325 MG/1
325 TABLET ORAL EVERY 6 HOURS PRN
Status: DISCONTINUED | OUTPATIENT
Start: 2024-06-26 | End: 2024-06-25

## 2024-06-25 RX ORDER — IBUPROFEN 200 MG
24 TABLET ORAL
Status: DISCONTINUED | OUTPATIENT
Start: 2024-06-25 | End: 2024-06-29 | Stop reason: HOSPADM

## 2024-06-25 RX ORDER — GLUCAGON 1 MG
1 KIT INJECTION
Status: DISCONTINUED | OUTPATIENT
Start: 2024-06-25 | End: 2024-06-29 | Stop reason: HOSPADM

## 2024-06-25 RX ORDER — ALUMINUM HYDROXIDE, MAGNESIUM HYDROXIDE, AND SIMETHICONE 1200; 120; 1200 MG/30ML; MG/30ML; MG/30ML
30 SUSPENSION ORAL 4 TIMES DAILY PRN
Status: DISCONTINUED | OUTPATIENT
Start: 2024-06-25 | End: 2024-06-29 | Stop reason: HOSPADM

## 2024-06-25 RX ORDER — IBUPROFEN 200 MG
16 TABLET ORAL
Status: DISCONTINUED | OUTPATIENT
Start: 2024-06-25 | End: 2024-06-29 | Stop reason: HOSPADM

## 2024-06-25 RX ORDER — ASPIRIN 325 MG
325 TABLET, DELAYED RELEASE (ENTERIC COATED) ORAL
Status: COMPLETED | OUTPATIENT
Start: 2024-06-25 | End: 2024-06-25

## 2024-06-25 RX ORDER — AMOXICILLIN 250 MG
1 CAPSULE ORAL 2 TIMES DAILY PRN
Status: DISCONTINUED | OUTPATIENT
Start: 2024-06-25 | End: 2024-06-28

## 2024-06-25 RX ORDER — LIDOCAINE HYDROCHLORIDE 20 MG/ML
15 SOLUTION OROPHARYNGEAL ONCE
Status: COMPLETED | OUTPATIENT
Start: 2024-06-25 | End: 2024-06-25

## 2024-06-25 RX ORDER — ACETAMINOPHEN 325 MG/1
650 TABLET ORAL EVERY 4 HOURS PRN
Status: DISCONTINUED | OUTPATIENT
Start: 2024-06-25 | End: 2024-06-29 | Stop reason: HOSPADM

## 2024-06-25 RX ORDER — ONDANSETRON HYDROCHLORIDE 2 MG/ML
4 INJECTION, SOLUTION INTRAVENOUS EVERY 6 HOURS PRN
Status: DISCONTINUED | OUTPATIENT
Start: 2024-06-25 | End: 2024-06-29 | Stop reason: HOSPADM

## 2024-06-25 RX ORDER — SODIUM CHLORIDE 0.9 % (FLUSH) 0.9 %
10 SYRINGE (ML) INJECTION EVERY 12 HOURS PRN
Status: DISCONTINUED | OUTPATIENT
Start: 2024-06-25 | End: 2024-06-29 | Stop reason: HOSPADM

## 2024-06-25 RX ORDER — NITROGLYCERIN 0.4 MG/1
0.4 TABLET SUBLINGUAL
Status: DISPENSED | OUTPATIENT
Start: 2024-06-25 | End: 2024-06-25

## 2024-06-25 RX ORDER — ALUMINUM HYDROXIDE, MAGNESIUM HYDROXIDE, AND SIMETHICONE 1200; 120; 1200 MG/30ML; MG/30ML; MG/30ML
30 SUSPENSION ORAL ONCE
Status: COMPLETED | OUTPATIENT
Start: 2024-06-25 | End: 2024-06-25

## 2024-06-25 RX ORDER — NALOXONE HCL 0.4 MG/ML
0.02 VIAL (ML) INJECTION
Status: DISCONTINUED | OUTPATIENT
Start: 2024-06-25 | End: 2024-06-29 | Stop reason: HOSPADM

## 2024-06-25 RX ORDER — TALC
6 POWDER (GRAM) TOPICAL NIGHTLY PRN
Status: DISCONTINUED | OUTPATIENT
Start: 2024-06-25 | End: 2024-06-29 | Stop reason: HOSPADM

## 2024-06-25 RX ORDER — CETIRIZINE HYDROCHLORIDE 10 MG/1
10 TABLET ORAL DAILY
Status: DISCONTINUED | OUTPATIENT
Start: 2024-06-26 | End: 2024-06-29 | Stop reason: HOSPADM

## 2024-06-25 RX ORDER — POLYETHYLENE GLYCOL 3350 17 G/17G
17 POWDER, FOR SOLUTION ORAL 2 TIMES DAILY PRN
Status: DISCONTINUED | OUTPATIENT
Start: 2024-06-25 | End: 2024-06-28

## 2024-06-25 RX ORDER — ENOXAPARIN SODIUM 100 MG/ML
40 INJECTION SUBCUTANEOUS EVERY 24 HOURS
Status: DISCONTINUED | OUTPATIENT
Start: 2024-06-25 | End: 2024-06-28

## 2024-06-25 RX ORDER — UBIDECARENONE 75 MG
1000 CAPSULE ORAL DAILY
Status: DISCONTINUED | OUTPATIENT
Start: 2024-06-26 | End: 2024-06-29 | Stop reason: HOSPADM

## 2024-06-25 RX ORDER — PROCHLORPERAZINE EDISYLATE 5 MG/ML
5 INJECTION INTRAMUSCULAR; INTRAVENOUS EVERY 6 HOURS PRN
Status: DISCONTINUED | OUTPATIENT
Start: 2024-06-25 | End: 2024-06-29 | Stop reason: HOSPADM

## 2024-06-25 RX ADMIN — NITROGLYCERIN 0.4 MG: 0.4 TABLET SUBLINGUAL at 06:06

## 2024-06-25 RX ADMIN — LIDOCAINE HYDROCHLORIDE 15 ML: 20 SOLUTION ORAL at 06:06

## 2024-06-25 RX ADMIN — NITROGLYCERIN 1.5 INCH: 20 OINTMENT TOPICAL at 07:06

## 2024-06-25 RX ADMIN — ALUMINUM HYDROXIDE, MAGNESIUM HYDROXIDE, AND SIMETHICONE 30 ML: 200; 200; 20 SUSPENSION ORAL at 06:06

## 2024-06-25 RX ADMIN — ENOXAPARIN SODIUM 40 MG: 40 INJECTION SUBCUTANEOUS at 11:06

## 2024-06-25 RX ADMIN — IOPAMIDOL 100 ML: 755 INJECTION, SOLUTION INTRAVENOUS at 07:06

## 2024-06-25 RX ADMIN — ASPIRIN 325 MG: 325 TABLET, COATED ORAL at 06:06

## 2024-06-25 RX ADMIN — NITROGLYCERIN 0.4 MG: 0.4 TABLET SUBLINGUAL at 07:06

## 2024-06-25 NOTE — ED PROVIDER NOTES
Encounter Date: 6/25/2024    SCRIBE #1 NOTE: I, Love Henderson, am scribing for, and in the presence of,  Alok Gracia III, MD. I have scribed the following portions of the note - the EKG reading. Other sections scribed: HPI, ROS, PE.       History     Chief Complaint   Patient presents with    Chest Pain     Midsternal chest tightness radiating to L side/SOB starting appx 5min PTA. S/P Lap catrachito 6/20. Hx HTN     58 year old female with a hx of HTN presents to the ED with chest pain beginning 5 minutes PTA. Patient reports she was sitting in the car when suddenly experienced pressure-like chest pain that sometimes radiates between the shoulder blades, chest tightness, and SOB. She reports the pain is less intense now, rating her pain at a 7/10 when it was 10/10 before. She reports the pain is worse with a deep breath. Patient denies fever or vomiting. She states she was a little nauseous before but is not nauseous in room. Patient reports she is seeing a cardiologists for heart palpitations. Patient reports recent surgical hx of lap catrachito on 6/20. Patient's PCP is Dr. Alford.    The history is provided by the patient and medical records. No  was used.     Review of patient's allergies indicates:   Allergen Reactions    Fig Swelling    Lisinopril Other (See Comments)     Past Medical History:   Diagnosis Date    Hypertension      Past Surgical History:   Procedure Laterality Date    BACK SURGERY  2016    LAPAROSCOPIC SLEEVE GASTRECTOMY  2019    NEPHRECTOMY Left 2003     Family History   Problem Relation Name Age of Onset    No Known Problems Mother      No Known Problems Father      No Known Problems Sister      No Known Problems Brother      No Known Problems Maternal Aunt      No Known Problems Maternal Uncle      No Known Problems Paternal Aunt      No Known Problems Paternal Uncle      No Known Problems Maternal Grandmother      No Known Problems Maternal Grandfather      No Known Problems  Paternal Grandmother      No Known Problems Paternal Grandfather      No Known Problems Other      Anesthesia problems Neg Hx      Broken bones Neg Hx      Cancer Neg Hx      Clotting disorder Neg Hx      Collagen disease Neg Hx      Diabetes Neg Hx      Dislocations Neg Hx      Osteoporosis Neg Hx      Rheumatologic disease Neg Hx      Scoliosis Neg Hx      Severe sprains Neg Hx       Social History     Tobacco Use    Smoking status: Never    Smokeless tobacco: Never   Substance Use Topics    Alcohol use: Never    Drug use: Never     Review of Systems   Constitutional:  Negative for fatigue, fever and unexpected weight change.   HENT:  Negative for congestion and rhinorrhea.    Eyes:  Negative for pain.   Respiratory:  Positive for chest tightness and shortness of breath. Negative for wheezing.    Cardiovascular:  Positive for chest pain.   Gastrointestinal:  Positive for nausea. Negative for abdominal pain, constipation, diarrhea and vomiting.   Genitourinary:  Negative for dysuria.   Musculoskeletal:  Negative for back pain and neck pain.   Skin:  Negative for rash.   Allergic/Immunologic: Negative for environmental allergies, food allergies and immunocompromised state.   Neurological:  Negative for dizziness and speech difficulty.   Hematological:  Does not bruise/bleed easily.   Psychiatric/Behavioral:  Negative for sleep disturbance and suicidal ideas.        Physical Exam     Initial Vitals [06/25/24 1551]   BP Pulse Resp Temp SpO2   125/69 79 18 97.9 °F (36.6 °C) 95 %      MAP       --         Physical Exam    Nursing note and vitals reviewed.  Constitutional: She appears well-developed and well-nourished.   Mildly uncomfortable appearing   HENT:   Head: Normocephalic and atraumatic.   Mouth/Throat: Oropharynx is clear and moist.   Eyes: Conjunctivae are normal. Pupils are equal, round, and reactive to light.   Neck: Neck supple.   Normal range of motion.  Cardiovascular:  Normal rate, regular rhythm and  normal heart sounds.           Pulmonary/Chest: Breath sounds normal. She has no wheezes. She has no rhonchi. She has no rales.   Abdominal: Abdomen is soft. Bowel sounds are normal. There is no abdominal tenderness.   Musculoskeletal:         General: Normal range of motion.      Cervical back: Normal range of motion and neck supple.     Neurological: She is alert and oriented to person, place, and time. GCS score is 15. GCS eye subscore is 4. GCS verbal subscore is 5. GCS motor subscore is 6.   Skin: Skin is warm and dry. Capillary refill takes less than 2 seconds.   Psychiatric: She has a normal mood and affect. Her behavior is normal. Judgment and thought content normal.         ED Course   Procedures  Labs Reviewed   COMPREHENSIVE METABOLIC PANEL - Abnormal; Notable for the following components:       Result Value    Glucose 106 (*)     AST 64 (*)     All other components within normal limits   CBC WITH DIFFERENTIAL - Abnormal; Notable for the following components:    MCV 78.8 (*)     MCH 25.6 (*)     MCHC 32.5 (*)     All other components within normal limits   TROPONIN I - Normal   B-TYPE NATRIURETIC PEPTIDE - Normal   CBC W/ AUTO DIFFERENTIAL    Narrative:     The following orders were created for panel order CBC auto differential.  Procedure                               Abnormality         Status                     ---------                               -----------         ------                     CBC with Differential[[id number]]       Abnormal            Final result                 Please view results for these tests on the individual orders.     EKG Readings: (Independently Interpreted)   Initial Reading: No STEMI. Rhythm: Normal Sinus Rhythm. Heart Rate: 79. Ectopy: No Ectopy. Conduction: Normal. ST Segments: Normal ST Segments. T Waves: Normal. Clinical Impression: Normal Sinus Rhythm   Done at 15:48       Imaging Results              CTA Chest Non-Coronary (PE Studies) (Final result)  Result  time 06/25/24 19:15:09      Final result by Henrique Nolasco MD (06/25/24 19:15:09)                   Impression:      No pulmonary embolism identified.      Electronically signed by: Henrique Nolasco  Date:    06/25/2024  Time:    19:15               Narrative:    EXAMINATION:  CTA CHEST NON CORONARY (PE STUDIES)    CLINICAL HISTORY:  Pulmonary embolism (PE) suspected, unknown D-dimer;    TECHNIQUE:  CTA imaging of the chest after IV contrast. Axial, coronal and sagittal reconstructions, including MIP images, are reviewed. Dose length product 412 mGycm. Automatic exposure control, adjustment of mA/kV or iterative reconstruction technique used to limit radiation dose.    COMPARISON:  No relevant comparison studies available at the time of dictation.    FINDINGS:  Diagnostic quality: Adequate    Pulmonary embolism: None identified.    Lung parenchyma: Areas of mild atelectasis in the lower lungs.    Pleural effusion: None.    Mediastinum/fatuma: Normal heart size and thoracic aortic caliber.  No pathologically enlarged lymph node.    Chest wall/axilla: No significant findings.    Upper abdomen: Previous cholecystectomy and sleeve gastrectomy.    Bones: No acute osseous process.                                       X-Ray Chest 1 View (Final result)  Result time 06/25/24 16:54:14      Final result by Devin Burgos MD (06/25/24 16:54:14)                   Impression:      No acute findings.      Electronically signed by: Devin Burgos  Date:    06/25/2024  Time:    16:54               Narrative:    EXAMINATION:  XR CHEST 1 VIEW    CLINICAL HISTORY:  Chest pain, unspecified    COMPARISON:  15 August 2017    FINDINGS:  Frontal view of the chest was obtained. Heart is not enlarged.  The lungs are grossly clear.  There is no pneumothorax.                                       Medications   nitroGLYCERIN SL tablet 0.4 mg (0.4 mg Sublingual Given 6/25/24 1830)   aluminum-magnesium hydroxide-simethicone 200-200-20 mg/5 mL  suspension 30 mL (30 mLs Oral Given 6/25/24 1830)     And   LIDOcaine viscous HCl 2% oral solution 15 mL (15 mLs Oral Given 6/25/24 1830)   aspirin EC tablet 325 mg (325 mg Oral Given 6/25/24 1830)   iopamidoL (ISOVUE-370) injection 100 mL (100 mLs Intravenous Given 6/25/24 1900)     Medical Decision Making  The differential diagnosis includes, but is not limited to, chest pain, pleurisy, and acute coronary syndrome.    Given GI cocktail because of recent GI issues it did not have any effect on his chest pain his EKG and cardiac enzymes are normal because of recent hospitalization CT was done to rule out pulmonary embolus it was negative discussed case with [facility] will admit and rule out initial information was patient had a negative angiogram 2 years ago when in fact her angiogram was 5 years ago and had some luminal disease    Problems Addressed:  Chest pain: complicated acute illness or injury with systemic symptoms that poses a threat to life or bodily functions    Amount and/or Complexity of Data Reviewed  Labs: ordered.  Radiology: ordered.  ECG/medicine tests: ordered and independent interpretation performed.     Details: No STEMI. Rhythm: Normal Sinus Rhythm. Heart Rate: 79. Ectopy: No Ectopy. Conduction: Normal. ST Segments: Normal ST Segments. T Waves: Normal. Clinical Impression: Normal Sinus Rhythm   Done at 15:48     Risk  OTC drugs.  Prescription drug management.               ED Course as of 06/25/24 1937 Tue Jun 25, 2024 1858 Chest pain-free EKG cardiac enzymes negative chest pain was resolved with aspirin and nitro I ever recent hospitalization and surgery will CT chest rule out PE [FK]   1900 Paged [facility]IS [JM]   1918 Paged hospitalist [JM]      ED Course User Index  [FK] Alok Gracia III, MD  [JM] Beltran Sorensen                           Clinical Impression:  Final diagnoses:  [R07.9] Chest pain  [R07.9] Chest pain, unspecified type (Primary)          ED Disposition Condition    Admit Stable                 Alok Gracia III, MD  06/25/24 1937

## 2024-06-25 NOTE — FIRST PROVIDER EVALUATION
Medical screening examination initiated.  I have conducted a focused provider triage encounter, findings are as follows:    Brief history of present illness:  58-year-old female presents to ED for evaluation of chest tightness and shortness of breath radiating to her left side for approximately 5 minutes.  Patient reports that she recently had a laparoscopic cholecystectomy done on 06/20/2024.    There were no vitals filed for this visit.    Pertinent physical exam:  Patient awake and alert, gripping chest in chair.     Brief workup plan:  labs, EKG, CXR    Preliminary workup initiated; this workup will be continued and followed by the physician or advanced practice provider that is assigned to the patient when roomed.

## 2024-06-25 NOTE — Clinical Note
Diagnosis: Chest pain, unspecified type [[id number]]   Future Attending Provider: KEVIN GARCIA [[id number]]   Admit to which facility:: [facility] [[id number]]   Reason for IP Medical Treatment  (Clinical interventions that can only be accomplished in the IP setting? ) :: Needs inpatient managment   Estimated Length of Stay:: 3-4 midnights   I certify that Inpatient services for greater than or equal to 2 midnights are medically necessary:: Yes   Plans for Post-Acute care--if anticipated (pick the single best option):: A. No post acute care anticipated at this time   Special Needs:: No Special Needs [1]

## 2024-06-26 LAB
ALBUMIN SERPL-MCNC: 3.7 G/DL (ref 3.5–5)
ALBUMIN SERPL-MCNC: 3.8 G/DL (ref 3.5–5)
ALBUMIN/GLOB SERPL: 1.2 RATIO (ref 1.1–2)
ALBUMIN/GLOB SERPL: 1.2 RATIO (ref 1.1–2)
ALP SERPL-CCNC: 206 UNIT/L (ref 40–150)
ALP SERPL-CCNC: 229 UNIT/L (ref 40–150)
ALT SERPL-CCNC: 330 UNIT/L (ref 0–55)
ALT SERPL-CCNC: 512 UNIT/L (ref 0–55)
ANION GAP SERPL CALC-SCNC: 10 MEQ/L
ANION GAP SERPL CALC-SCNC: 9 MEQ/L
APICAL FOUR CHAMBER EJECTION FRACTION: 59 %
APICAL TWO CHAMBER EJECTION FRACTION: 53 %
AST SERPL-CCNC: 1210 UNIT/L (ref 5–34)
AST SERPL-CCNC: 962 UNIT/L (ref 5–34)
AV INDEX (PROSTH): 0.76
AV MEAN GRADIENT: 3 MMHG
AV PEAK GRADIENT: 5 MMHG
AV VALVE AREA BY VELOCITY RATIO: 2.73 CM²
AV VALVE AREA: 2.38 CM²
AV VELOCITY RATIO: 0.87
BASOPHILS # BLD AUTO: 0.01 X10(3)/MCL
BASOPHILS NFR BLD AUTO: 0.3 %
BILIRUB SERPL-MCNC: 2.2 MG/DL
BILIRUB SERPL-MCNC: 2.6 MG/DL
BSA FOR ECHO PROCEDURE: 1.98 M2
BUN SERPL-MCNC: 6.3 MG/DL (ref 9.8–20.1)
BUN SERPL-MCNC: 8.3 MG/DL (ref 9.8–20.1)
CALCIUM SERPL-MCNC: 9.2 MG/DL (ref 8.4–10.2)
CALCIUM SERPL-MCNC: 9.5 MG/DL (ref 8.4–10.2)
CHLORIDE SERPL-SCNC: 108 MMOL/L (ref 98–107)
CHLORIDE SERPL-SCNC: 109 MMOL/L (ref 98–107)
CO2 SERPL-SCNC: 25 MMOL/L (ref 22–29)
CO2 SERPL-SCNC: 25 MMOL/L (ref 22–29)
CREAT SERPL-MCNC: 0.65 MG/DL (ref 0.55–1.02)
CREAT SERPL-MCNC: 0.67 MG/DL (ref 0.55–1.02)
CREAT/UREA NIT SERPL: 10
CREAT/UREA NIT SERPL: 12
CV ECHO LV RWT: 0.37 CM
DOP CALC AO PEAK VEL: 1.16 M/S
DOP CALC AO VTI: 26.9 CM
DOP CALC LVOT AREA: 3.1 CM2
DOP CALC LVOT DIAMETER: 2 CM
DOP CALC LVOT PEAK VEL: 1.01 M/S
DOP CALC LVOT STROKE VOLUME: 64.06 CM3
DOP CALC MV VTI: 26.3 CM
DOP CALCLVOT PEAK VEL VTI: 20.4 CM
E WAVE DECELERATION TIME: 195 MSEC
E/A RATIO: 1.38
E/E' RATIO: 5.75 M/S
ECHO LV POSTERIOR WALL: 0.9 CM (ref 0.6–1.1)
EOSINOPHIL # BLD AUTO: 0.08 X10(3)/MCL (ref 0–0.9)
EOSINOPHIL NFR BLD AUTO: 2.1 %
ERYTHROCYTE [DISTWIDTH] IN BLOOD BY AUTOMATED COUNT: 16.7 % (ref 11.5–17)
FRACTIONAL SHORTENING: 35 % (ref 28–44)
GFR SERPLBLD CREATININE-BSD FMLA CKD-EPI: >60 ML/MIN/1.73/M2
GFR SERPLBLD CREATININE-BSD FMLA CKD-EPI: >60 ML/MIN/1.73/M2
GLOBULIN SER-MCNC: 3.2 GM/DL (ref 2.4–3.5)
GLOBULIN SER-MCNC: 3.3 GM/DL (ref 2.4–3.5)
GLUCOSE SERPL-MCNC: 91 MG/DL (ref 74–100)
GLUCOSE SERPL-MCNC: 94 MG/DL (ref 74–100)
HCT VFR BLD AUTO: 38.5 % (ref 37–47)
HGB BLD-MCNC: 12.4 G/DL (ref 12–16)
HR MV ECHO: 55 BPM
IMM GRANULOCYTES # BLD AUTO: 0.01 X10(3)/MCL (ref 0–0.04)
IMM GRANULOCYTES NFR BLD AUTO: 0.3 %
INTERVENTRICULAR SEPTUM: 0.8 CM (ref 0.6–1.1)
LEFT ATRIUM AREA SYSTOLIC (APICAL 2 CHAMBER): 19.4 CM2
LEFT ATRIUM AREA SYSTOLIC (APICAL 4 CHAMBER): 17.9 CM2
LEFT ATRIUM SIZE: 3.6 CM
LEFT ATRIUM VOLUME INDEX MOD: 26.5 ML/M2
LEFT ATRIUM VOLUME MOD: 51.2 CM3
LEFT INTERNAL DIMENSION IN SYSTOLE: 3.2 CM (ref 2.1–4)
LEFT VENTRICLE DIASTOLIC VOLUME INDEX: 58.45 ML/M2
LEFT VENTRICLE DIASTOLIC VOLUME: 112.81 ML
LEFT VENTRICLE END DIASTOLIC VOLUME APICAL 2 CHAMBER: 83.4 ML
LEFT VENTRICLE END DIASTOLIC VOLUME APICAL 4 CHAMBER: 102 ML
LEFT VENTRICLE END SYSTOLIC VOLUME APICAL 2 CHAMBER: 58 ML
LEFT VENTRICLE END SYSTOLIC VOLUME APICAL 4 CHAMBER: 45.5 ML
LEFT VENTRICLE MASS INDEX: 74 G/M2
LEFT VENTRICLE SYSTOLIC VOLUME INDEX: 21.2 ML/M2
LEFT VENTRICLE SYSTOLIC VOLUME: 40.96 ML
LEFT VENTRICULAR INTERNAL DIMENSION IN DIASTOLE: 4.9 CM (ref 3.5–6)
LEFT VENTRICULAR MASS: 141.91 G
LV LATERAL E/E' RATIO: 4.6 M/S
LV SEPTAL E/E' RATIO: 7.67 M/S
LVED V (TEICH): 112.81 ML
LVES V (TEICH): 40.96 ML
LVOT MG: 2 MMHG
LVOT MV: 0.63 CM/S
LYMPHOCYTES # BLD AUTO: 1.1 X10(3)/MCL (ref 0.6–4.6)
LYMPHOCYTES NFR BLD AUTO: 28.2 %
MCH RBC QN AUTO: 25.6 PG (ref 27–31)
MCHC RBC AUTO-ENTMCNC: 32.2 G/DL (ref 33–36)
MCV RBC AUTO: 79.4 FL (ref 80–94)
MONOCYTES # BLD AUTO: 0.5 X10(3)/MCL (ref 0.1–1.3)
MONOCYTES NFR BLD AUTO: 12.8 %
MV MEAN GRADIENT: 1 MMHG
MV PEAK A VEL: 0.5 M/S
MV PEAK E VEL: 0.69 M/S
MV PEAK GRADIENT: 2 MMHG
MV STENOSIS PRESSURE HALF TIME: 64 MS
MV VALVE AREA BY CONTINUITY EQUATION: 2.44 CM2
MV VALVE AREA P 1/2 METHOD: 3.44 CM2
NEUTROPHILS # BLD AUTO: 2.2 X10(3)/MCL (ref 2.1–9.2)
NEUTROPHILS NFR BLD AUTO: 56.3 %
NRBC BLD AUTO-RTO: 0 %
OHS LV EJECTION FRACTION SIMPSONS BIPLANE MOD: 56 %
OHS QRS DURATION: 70 MS
OHS QRS DURATION: 72 MS
OHS QRS DURATION: 74 MS
OHS QTC CALCULATION: 434 MS
OHS QTC CALCULATION: 441 MS
OHS QTC CALCULATION: 451 MS
PISA TR MAX VEL: 1.9 M/S
PLATELET # BLD AUTO: 220 X10(3)/MCL (ref 130–400)
PMV BLD AUTO: 10.2 FL (ref 7.4–10.4)
POTASSIUM SERPL-SCNC: 3.9 MMOL/L (ref 3.5–5.1)
POTASSIUM SERPL-SCNC: 3.9 MMOL/L (ref 3.5–5.1)
PROT SERPL-MCNC: 6.9 GM/DL (ref 6.4–8.3)
PROT SERPL-MCNC: 7.1 GM/DL (ref 6.4–8.3)
RA PRESSURE ESTIMATED: 3 MMHG
RBC # BLD AUTO: 4.85 X10(6)/MCL (ref 4.2–5.4)
RV TB RVSP: 5 MMHG
SINUS: 3.2 CM
SODIUM SERPL-SCNC: 143 MMOL/L (ref 136–145)
SODIUM SERPL-SCNC: 143 MMOL/L (ref 136–145)
TDI LATERAL: 0.15 M/S
TDI SEPTAL: 0.09 M/S
TDI: 0.12 M/S
TR MAX PG: 14 MMHG
TRICUSPID ANNULAR PLANE SYSTOLIC EXCURSION: 2.43 CM
TROPONIN I SERPL-MCNC: <0.01 NG/ML (ref 0–0.04)
TV REST PULMONARY ARTERY PRESSURE: 17 MMHG
WBC # BLD AUTO: 3.9 X10(3)/MCL (ref 4.5–11.5)
Z-SCORE OF LEFT VENTRICULAR DIMENSION IN END DIASTOLE: -1.09
Z-SCORE OF LEFT VENTRICULAR DIMENSION IN END SYSTOLE: -0.39

## 2024-06-26 PROCEDURE — 11000001 HC ACUTE MED/SURG PRIVATE ROOM

## 2024-06-26 PROCEDURE — 93010 ELECTROCARDIOGRAM REPORT: CPT | Mod: ,,, | Performed by: INTERNAL MEDICINE

## 2024-06-26 PROCEDURE — 93005 ELECTROCARDIOGRAM TRACING: CPT

## 2024-06-26 PROCEDURE — 36415 COLL VENOUS BLD VENIPUNCTURE: CPT

## 2024-06-26 PROCEDURE — 21400001 HC TELEMETRY ROOM

## 2024-06-26 PROCEDURE — 25000003 PHARM REV CODE 250

## 2024-06-26 PROCEDURE — 25000003 PHARM REV CODE 250: Performed by: INTERNAL MEDICINE

## 2024-06-26 PROCEDURE — 84484 ASSAY OF TROPONIN QUANT: CPT

## 2024-06-26 PROCEDURE — 63600175 PHARM REV CODE 636 W HCPCS

## 2024-06-26 PROCEDURE — 80053 COMPREHEN METABOLIC PANEL: CPT

## 2024-06-26 PROCEDURE — 80053 COMPREHEN METABOLIC PANEL: CPT | Performed by: NURSE PRACTITIONER

## 2024-06-26 PROCEDURE — 25500020 PHARM REV CODE 255: Performed by: INTERNAL MEDICINE

## 2024-06-26 PROCEDURE — 85025 COMPLETE CBC W/AUTO DIFF WBC: CPT

## 2024-06-26 RX ORDER — FAMOTIDINE 20 MG/1
20 TABLET, FILM COATED ORAL 2 TIMES DAILY
Status: DISCONTINUED | OUTPATIENT
Start: 2024-06-26 | End: 2024-06-29 | Stop reason: HOSPADM

## 2024-06-26 RX ORDER — OXYCODONE HYDROCHLORIDE 5 MG/1
5 TABLET ORAL EVERY 6 HOURS PRN
Status: DISCONTINUED | OUTPATIENT
Start: 2024-06-26 | End: 2024-06-29 | Stop reason: HOSPADM

## 2024-06-26 RX ORDER — OXYCODONE HYDROCHLORIDE 10 MG/1
10 TABLET ORAL EVERY 6 HOURS PRN
Status: DISCONTINUED | OUTPATIENT
Start: 2024-06-26 | End: 2024-06-29 | Stop reason: HOSPADM

## 2024-06-26 RX ORDER — MORPHINE SULFATE 4 MG/ML
2 INJECTION, SOLUTION INTRAMUSCULAR; INTRAVENOUS EVERY 8 HOURS PRN
Status: DISPENSED | OUTPATIENT
Start: 2024-06-26 | End: 2024-06-27

## 2024-06-26 RX ORDER — ASPIRIN 81 MG/1
81 TABLET ORAL DAILY
Status: DISCONTINUED | OUTPATIENT
Start: 2024-06-26 | End: 2024-06-29 | Stop reason: HOSPADM

## 2024-06-26 RX ADMIN — OXYCODONE HYDROCHLORIDE 5 MG: 5 TABLET ORAL at 05:06

## 2024-06-26 RX ADMIN — FAMOTIDINE 20 MG: 20 TABLET, FILM COATED ORAL at 08:06

## 2024-06-26 RX ADMIN — ACETAMINOPHEN 650 MG: 325 TABLET, FILM COATED ORAL at 03:06

## 2024-06-26 RX ADMIN — ALUMINUM HYDROXIDE, MAGNESIUM HYDROXIDE, AND SIMETHICONE 30 ML: 200; 200; 20 SUSPENSION ORAL at 04:06

## 2024-06-26 RX ADMIN — ENOXAPARIN SODIUM 40 MG: 40 INJECTION SUBCUTANEOUS at 04:06

## 2024-06-26 RX ADMIN — CETIRIZINE HYDROCHLORIDE 10 MG: 10 TABLET, FILM COATED ORAL at 08:06

## 2024-06-26 RX ADMIN — FAMOTIDINE 20 MG: 20 TABLET, FILM COATED ORAL at 11:06

## 2024-06-26 RX ADMIN — CYANOCOBALAMIN TAB 500 MCG 1000 MCG: 500 TAB at 08:06

## 2024-06-26 RX ADMIN — IOHEXOL 100 ML: 350 INJECTION, SOLUTION INTRAVENOUS at 05:06

## 2024-06-26 RX ADMIN — ACETAMINOPHEN 650 MG: 325 TABLET, FILM COATED ORAL at 08:06

## 2024-06-26 RX ADMIN — ONDANSETRON 4 MG: 2 INJECTION INTRAMUSCULAR; INTRAVENOUS at 05:06

## 2024-06-26 NOTE — PROGRESS NOTES
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  Chest pain described as smothering, 10/10, and associated with shortness of breath, sweating, anxiety.  Exacerbated by exertion.  Alleviated by rest and by nitroglycerin given in the ED.  No treatments tried at home and the patient was immediately drove into the ER by a friend.  Patient reports she woke up feeling well this morning without any new complaints.  She denies ever having felt this way before.     Patient received a laparoscopic cholecystectomy 5 days ago 06/20/2024 but otherwise has no recent medical changes.     ED course:  VSS on arrival.  GCS 15, cardiopulmonary exam benign.  Troponin and BNP WNL.  EKG normal sinus rhythm.  CTA chest showed no acute findings.  CXR shows no acute abnormality.  ED treated the patient with nitroglycerin, aspirin 325.  Patient also received GI cocktail because of recent cholecystectomy in concern for possible GERD but this did not have any effect.  Cis consulted for the morning to rule out any cardiac origin due to very classic cardiovascular presentation.       Interval Hx:   Afebrile.  Lethargic, resting in the bed.   and sitter at bedside.  She denies any nausea or vomiting.  Chest/epigastric pain improving.  Doing well on room air.      Labs this morning showing stable hemoglobin.  Lives covered due major electrolyte abnormalities.  ALP ALT and AST worsened since admission and bilirubin has increased.  Troponin has remained within normal limits and EKG showed no ischemic changes at admission     CT abdomen and pelvis showed right hepatic lobe and  "pericolic gutter stranding due to her recent cholecystectomy.  There was biliary ductal dilatation secondary to cholecystectomy and gastritis was suspected.      Objective/physical exam:  Vitals:    06/26/24 0230 06/26/24 0421 06/26/24 0700 06/26/24 0800   BP: 135/80 135/79 136/80 136/80   Pulse: 65 62 62 62   Resp: 18  18 18   Temp: 98.5 °F (36.9 °C) 97.9 °F (36.6 °C) 98.3 °F (36.8 °C) 98.2 °F (36.8 °C)   TempSrc: Oral Oral Oral    SpO2: 99% 99% 100% 100%   Weight: 85.7 kg (189 lb)      Height: 5' 5" (1.651 m)        General: In no acute distress, afebrile  Respiratory: Clear to auscultation bilaterally  Cardiovascular: S1, S2, no appreciable murmur  Abdomen: Soft, nontender, BS +  MSK: Warm, no lower extremity edema, no clubbing or cyanosis  Skin: Laparotomy scars healing well  Neurologic: Alert and oriented x4, moving all extremities with good strength     Lab Results   Component Value Date     06/26/2024    K 3.9 06/26/2024     (H) 06/26/2024    CO2 25 06/26/2024    BUN 8.3 (L) 06/26/2024    CREATININE 0.67 06/26/2024    CALCIUM 9.5 06/26/2024    EGFRNONAA >60 08/03/2021      Lab Results   Component Value Date     (H) 06/26/2024     (H) 06/26/2024    ALKPHOS 206 (H) 06/26/2024    BILITOT 2.2 (H) 06/26/2024      Lab Results   Component Value Date    WBC 3.90 (L) 06/26/2024    HGB 12.4 06/26/2024    HCT 38.5 06/26/2024    MCV 79.4 (L) 06/26/2024     06/26/2024           Medications:   cetirizine  10 mg Oral Daily    cyanocobalamin  1,000 mcg Oral Daily    enoxparin  40 mg Subcutaneous Daily        Current Facility-Administered Medications:     acetaminophen, 650 mg, Oral, Q4H PRN    aluminum-magnesium hydroxide-simethicone, 30 mL, Oral, QID PRN    dextrose 10%, 12.5 g, Intravenous, PRN    dextrose 10%, 25 g, Intravenous, PRN    glucagon (human recombinant), 1 mg, Intramuscular, PRN    glucose, 16 g, Oral, PRN    glucose, 24 g, Oral, PRN    melatonin, 6 mg, Oral, Nightly PRN    " naloxone, 0.02 mg, Intravenous, PRN    ondansetron, 4 mg, Intravenous, Q6H PRN    polyethylene glycol, 17 g, Oral, BID PRN    prochlorperazine, 5 mg, Intravenous, Q6H PRN    senna-docusate 8.6-50 mg, 1 tablet, Oral, BID PRN    sodium chloride 0.9%, 10 mL, Intravenous, Q12H PRN     Assessment/Plan:    Chest pain-cardiac versus atypical  Recent laparoscopic cholecystectomy 06/20/2024  Abdominal LFTs-?  Postsurgical  Possible gastritis seen on CT    HX:  Nonobstructive CAD, VHD, PFO, Solitary left kidney, fibromyalgia, generalized anxiety, osteoporosis    Plan:   -Troponin remained within normal.  Continue telemetry.  Follow TTE.  Cardiology consulted further input  -monitor LFTs.  Avoid further hepatotoxic medications.  General surgery consulted for further input.  -analgesics as needed.      Lovenox      Tin Alejandre MD

## 2024-06-26 NOTE — PLAN OF CARE
06/26/24 1600   Discharge Assessment   Assessment Type Discharge Planning Assessment   Confirmed/corrected address, phone number and insurance Yes   Confirmed Demographics Correct on Facesheet   Source of Information patient   When was your last doctors appointment?   (may 2024)   Communicated TERE with patient/caregiver Date not available/Unable to determine   Reason For Admission Chest pain   People in Home spouse;child(eladio), adult   Do you expect to return to your current living situation? Yes   Do you have help at home or someone to help you manage your care at home? Yes   Who are your caregiver(s) and their phone number(s)? Koko Cornejo  Spouse  [phone]   Prior to hospitilization cognitive status: Alert/Oriented   Current cognitive status: Alert/Oriented   Walking or Climbing Stairs Difficulty yes   Walking or Climbing Stairs ambulation difficulty, requires equipment   Mobility Management straight cane   Dressing/Bathing Difficulty yes   Dressing/Bathing bathing difficulty, assistance 1 person;dressing difficulty, assistance 1 person   Dressing/Bathing Management assist from    Equipment Currently Used at Home cane, straight;blood pressure machine   Readmission within 30 days? No   Patient currently being followed by outpatient case management? No   Do you currently have service(s) that help you manage your care at home? No   Do you take prescription medications? Yes   Do you have prescription coverage? Yes   Coverage [employer]   Do you have any problems affording any of your prescribed medications? No   Is the patient taking medications as prescribed? yes   Who is going to help you get home at discharge? Koko Cornejo  Spouse  [phone]   How do you get to doctors appointments? family or friend will provide   Are you on dialysis? No   Do you take coumadin? No   Discharge Plan A Home with family   Discharge Plan B Home with family   DME Needed Upon Discharge  none   Discharge Plan discussed with:  Patient;Spouse/sig other   Name(s) and Number(s) Koko Cornejo  Spouse  [phone]   Transition of Care Barriers None   Physical Activity   On average, how many days per week do you engage in moderate to strenuous exercise (like a brisk walk)? 0 days   Financial Resource Strain   How hard is it for you to pay for the very basics like food, housing, medical care, and heating? Somewhat   Housing Stability   In the last 12 months, was there a time when you were not able to pay the mortgage or rent on time? Y   At any time in the past 12 months, were you homeless or living in a shelter (including now)? N   Transportation Needs   Has the lack of transportation kept you from medical appointments, meetings, work or from getting things needed for daily living? No   Food Insecurity   Within the past 12 months, you worried that your food would run out before you got the money to buy more. Never true   Within the past 12 months, the food you bought just didn't last and you didn't have money to get more. Never true   Stress   Do you feel stress - tense, restless, nervous, or anxious, or unable to sleep at night because your mind is troubled all the time - these days? Very much   Social Isolation   How often do you feel lonely or isolated from those around you?  Never   Alcohol Use   Q1: How often do you have a drink containing alcohol? Never   Utilities   In the past 12 months has the electric, gas, oil, or water company threatened to shut off services in your home? No   Health Literacy   How often do you need to have someone help you when you read instructions, pamphlets, or other written material from your doctor or pharmacy? Never   OTHER   Name(s) of People in Home Koko Cornejo  Spouse  [phone]

## 2024-06-26 NOTE — CONSULTS
[facility] General Surgery Service  ADMIT / CONSULT / H&P NOTE  Date: 6/26/2024    CC:   RUQ pain s/p lap catrachito 6/20    SUBJECTIVE    HPI:   Renetta Cornejo is a 58 y.o. female with past medical history of HTN and s/p lap catrachito 6/20 at [facility] who presented yesterday with chest pain. Cardiac work up negative. Patient pain now mostly RUQ and radiates to the back. General surgery consulted for post op complication. Patient reports she felt fine since surgery and had BM yesterday, but began to have this chest pain and RUQ pain yesterday. Denies any vomiting but she has been nauseous. She has been able to tolerate PO but does report decreased appetite. Denies any changes in urine or BM. Denies any fevers or chills.     ROS:  Negative except for HPI    PMH:   Past Medical History:   Diagnosis Date    Hypertension        PSH:   Past Surgical History:   Procedure Laterality Date    BACK SURGERY  2016    LAPAROSCOPIC SLEEVE GASTRECTOMY  2019    NEPHRECTOMY Left 2003       FamHx:   Family History   Problem Relation Name Age of Onset    No Known Problems Mother      No Known Problems Father      No Known Problems Sister      No Known Problems Brother      No Known Problems Maternal Aunt      No Known Problems Maternal Uncle      No Known Problems Paternal Aunt      No Known Problems Paternal Uncle      No Known Problems Maternal Grandmother      No Known Problems Maternal Grandfather      No Known Problems Paternal Grandmother      No Known Problems Paternal Grandfather      No Known Problems Other      Anesthesia problems Neg Hx      Broken bones Neg Hx      Cancer Neg Hx      Clotting disorder Neg Hx      Collagen disease Neg Hx      Diabetes Neg Hx      Dislocations Neg Hx      Osteoporosis Neg Hx      Rheumatologic disease Neg Hx      Scoliosis Neg Hx      Severe sprains Neg Hx         SocHx:  Social History     Socioeconomic History    Marital status:    Tobacco Use    Smoking status: Never    Smokeless tobacco: Never    Substance and Sexual Activity    Alcohol use: Never    Drug use: Never     Social Determinants of Health     Financial Resource Strain: Medium Risk (6/26/2024)    Overall Financial Resource Strain (CARDIA)     Difficulty of Paying Living Expenses: Somewhat hard   Food Insecurity: No Food Insecurity (6/26/2024)    Hunger Vital Sign     Worried About Running Out of Food in the Last Year: Never true     Ran Out of Food in the Last Year: Never true   Transportation Needs: No Transportation Needs (6/26/2024)    TRANSPORTATION NEEDS     Transportation : No   Physical Activity: Unknown (6/26/2024)    Exercise Vital Sign     Days of Exercise per Week: 0 days   Stress: Stress Concern Present (6/26/2024)    Citizen of [place] [employer] - Occupational Stress Questionnaire     Feeling of Stress : Very much   Housing Stability: High Risk (6/26/2024)    Housing Stability Vital Sign     Unable to Pay for Housing in the Last Year: Yes     Homeless in the Last Year: No       Allergies:   Review of patient's allergies indicates:   Allergen Reactions    Fig Swelling    Lisinopril Other (See Comments)       Medications:  No current facility-administered medications on file prior to encounter.     Current Outpatient Medications on File Prior to Encounter   Medication Sig Dispense Refill    acetaminophen (TYLENOL) 325 MG tablet Take 325 mg by mouth every 6 (six) hours as needed for Pain.      ALPRAZolam (XANAX) 1 MG tablet Take 1 mg by mouth every evening. (Patient not taking: Reported on 4/17/2024)      cetirizine (ZYRTEC) 10 MG tablet Take 10 mg by mouth once daily.      cyanocobalamin (VITAMIN B-12) 1000 MCG tablet Take 1,000 mcg by mouth.      diclofenac sodium (VOLTAREN) 1 % Gel       famotidine (PEPCID) 20 MG tablet Take 20 mg by mouth 2 (two) times daily as needed for Heartburn. (Patient not taking: Reported on 4/17/2024)      famotidine (PEPCID) 40 MG tablet Take 40 mg by mouth.      fluticasone propionate (FLONASE)  "50 mcg/actuation nasal spray INSTILL ONE SPRAY INTO EACH NOSTRIL ONCE A DAY      HYDROcodone-acetaminophen (NORCO)  mg per tablet       LIDOcaine (LIDODERM) 5 % Place 1 patch onto the skin once daily. Remove & Discard patch within 12 hours or as directed by MD 10 patch 0    losartan-hydrochlorothiazide 50-12.5 mg (HYZAAR) 50-12.5 mg per tablet       omeprazole (PRILOSEC) 20 MG capsule Take 20 mg by mouth. (Patient not taking: Reported on 4/17/2024)      RESTASIS 0.05 % ophthalmic emulsion Place 1 drop into both eyes once daily.         OBJECTIVE    VITAL SIGNS: 24 HR MIN & MAX LAST    Temp  Min: 97.8 °F (36.6 °C)  Max: 98.5 °F (36.9 °C)  98.2 °F (36.8 °C)        BP  Min: 126/80  Max: 157/90  (!) 146/73     Pulse  Min: 57  Max: 76  64     Resp  Min: 15  Max: 26  16    SpO2  Min: 98 %  Max: 100 %  98 %      HT: 5' 5" (165.1 cm)  WT: 85.7 kg (189 lb)  BMI: 31.5       Physical Exam:  General: NAD  HEENT: Anicteric sclera  Resp: Unlabored respirations  Cardiac: RRR  Abdomen: Soft, non distended, incisions clean, dry, intact, moderately tender to RUQ and epigastric region   Extremities: Well perfused    Results  Recent Labs   Lab 06/25/24  1624 06/26/24  0407 06/26/24  1034   WBC 6.58 3.90*  --    HGB 12.7 12.4  --    HCT 39.1 38.5  --     220  --     143 143   CO2 24 25 25   BUN 11.5 8.3* 6.3*   CREATININE 0.71 0.67 0.65   GLUCOSE 106* 94 91   CALCIUM 9.6 9.5 9.2   ALKPHOS 136 206* 229*       Imaging:  Imaging Results              CTA Chest Non-Coronary (PE Studies) (Final result)  Result time 06/25/24 19:15:09      Final result by Henrique Nolasco MD (06/25/24 19:15:09)                   Impression:      No pulmonary embolism identified.      Electronically signed by: Henrique Nolasco  Date:    06/25/2024  Time:    19:15               Narrative:    EXAMINATION:  CTA CHEST NON CORONARY (PE STUDIES)    CLINICAL HISTORY:  Pulmonary embolism (PE) suspected, unknown D-dimer;    TECHNIQUE:  CTA imaging of the " chest after IV contrast. Axial, coronal and sagittal reconstructions, including MIP images, are reviewed. Dose length product 412 mGycm. Automatic exposure control, adjustment of mA/kV or iterative reconstruction technique used to limit radiation dose.    COMPARISON:  No relevant comparison studies available at the time of dictation.    FINDINGS:  Diagnostic quality: Adequate    Pulmonary embolism: None identified.    Lung parenchyma: Areas of mild atelectasis in the lower lungs.    Pleural effusion: None.    Mediastinum/fatuma: Normal heart size and thoracic aortic caliber.  No pathologically enlarged lymph node.    Chest wall/axilla: No significant findings.    Upper abdomen: Previous cholecystectomy and sleeve gastrectomy.    Bones: No acute osseous process.                                       X-Ray Chest 1 View (Final result)  Result time 06/25/24 16:54:14      Final result by Devin Burgos MD (06/25/24 16:54:14)                   Impression:      No acute findings.      Electronically signed by: Devin Burgos  Date:    06/25/2024  Time:    16:54               Narrative:    EXAMINATION:  XR CHEST 1 VIEW    CLINICAL HISTORY:  Chest pain, unspecified    COMPARISON:  15 August 2017    FINDINGS:  Frontal view of the chest was obtained. Heart is not enlarged.  The lungs are grossly clear.  There is no pneumothorax.                                      A/P:   58 y.o. female  with past medical history of HTN and s/p lap catrachito 6/20 at [facility] who presented yesterday with chest pain. Cardiac work up negative. Patient pain now mostly RUQ and radiates to the back. General surgery consulted for post op complication. History and Exam concerning for biliary pathology.     - HIDA vs MRCP to evaluate for biloma/bile leak or retained stone   - May need GI consult pending HIDA/MRCP results   - NPO midnight incase need for ERCP tomorrow   - PRN pain and nausea   - Will continue to follow     Ramila Means MD  [employer] General Surgery  PGY-1

## 2024-06-26 NOTE — CONSULTS
Inpatient consult to Cardiology  Consult performed by: Zainab Perez FNP  Consult ordered by: Bang Coreas MD  Reason for consult: Chest Pain      [facility] - 9th Floor Med Surg    Cardiology  Consult Note    Patient Name: Renetta Cornejo  MRN: [id number]  Admission Date: 6/25/2024  Hospital Length of Stay: 1 days  Code Status: Full Code   Attending Provider: Bang Coreas MD   Consulting Provider: ROSE Hart  Primary Care Physician: Juan Luis Alford DO  Principal Problem:<principal problem not specified>    Patient information was obtained from patient, past medical records, and ER records.     Subjective:     Chief Complaint/Reason for Consult: Chest Pain    HPI: Patient is a 58 y.o. female known to [facility], Dr. 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 She presented to [facility] ER on 6.25.24 with c/o acute onset substernal and left-sided chest pain radiating to the left arm while sitting down riding in  the car. Chest pain described as smothering, 10/10, and associated with shortness of breath, sweating, nausea, anxiety. Exacerbated by exertion. Alleviated by rest and by nitroglycerin given in the ED. Patient was also given a GI cocktail simultaneously with NTG in ER. No treatments tried prior to arrival as patient was immediately drove into the ER by a friend. Patient reported she woke up feeling well that morning without any new complaints. She denied ever experiencing this in the past.  Upon arrival to ER she underwent multiple laboratory and diagnostic testing. Lab values revealed negative Troponin, AST 64, and otherwise unremarkable. Vital signs were stable. EKG revealed NSR. CXR with no acute findings. CT chest negative for PE.       PMH: Unilateral Kidney (Rt), MANUEL, Palpitations, Fibromyalgia on chronic pain management, General Anxiety Disorder, Osteoarthritis,  GERD  PSH: Left Nephrectomy (donated), Back Surgery, Recent Cholecystectomy  Family History: Father: CAD, MI.   Mother: CAD, CVA  Social History:  Former Smoker.  Denies current use of tobacco, ETOH, or illicit drugs    Previous Cardiac Diagnostics:     Holter Monitor (2.15.2022)  Patient was monitored for 23 hours and 38 minutes.  Total of 95.733 beats. Less than 0.5% were ventricular ectopy beats, less than 0.5% were Supraventricular beats.      1. This is a good quality study.   2. Predominant rhythm is normal sinus rhythm.   3. The minimum heart rate recorded was 51 beats / minute (2/16/2022). The maximum heart rate is 138 beats / minute (2/16/2022). The mean heart rate is 64 beats / minute.   4. No evidence of AV block is noted.   5. Rare premature atrial contractions noted.   6. No evidence of supraventricular tachycardia is noted.  7. Rare premature ventricular contractions noted.    8. No evidence of ventricular tachycardia is noted.  9. No evidence of supraventricular arrhythmia is noted.  10. No evidence of ventricular arrhythmia is noted.  11. No pauses were noted.   12. NO A FIB    TTE (6.26.2019):  1.  Anterior mitral valve prolapse with mild to moderate mitral valve   regurgitation      seen with a mitral annulus of 3.67 and no evidence of pulmonary venous       flow reversal noted.   2. Atrial septal aneurysm without clots.   3.  Small patent foramen ovale with right-to-left shunting picked up by   bubble      studies.   4.  Ejection fraction of 50% to 55%.   5.  Trace to mild tricuspid valve regurgitation.   6.  No pericardial effusion.   7.  Normal size aortic root and ascending aorta     LHC/RHC (6.26.2019)  POSTOPERATIVE DIAGNOSES:   1.  Very mild coronary artery disease.   2.  Normal renals bilaterally.   3.  No evidence of pulmonary artery hypertension.   4.  Qp/Qs of 0.83       Lower Extremity US DVT Study (5.3.2017)  Normal venous duplex study of the left lower extremity. There is no evidence  of deep or superficial venous thrombosis.  Patent right common femoral vein.      Past Medical History:   Diagnosis Date    Hypertension      Past Surgical History:   Procedure Laterality Date    BACK SURGERY  2016    LAPAROSCOPIC SLEEVE GASTRECTOMY  2019    NEPHRECTOMY Left 2003     Review of patient's allergies indicates:   Allergen Reactions    Fig Swelling    Lisinopril Other (See Comments)     No current facility-administered medications on file prior to encounter.     Current Outpatient Medications on File Prior to Encounter   Medication Sig    acetaminophen (TYLENOL) 325 MG tablet Take 325 mg by mouth every 6 (six) hours as needed for Pain.    ALPRAZolam (XANAX) 1 MG tablet Take 1 mg by mouth every evening. (Patient not taking: Reported on 4/17/2024)    cetirizine (ZYRTEC) 10 MG tablet Take 10 mg by mouth once daily.    cyanocobalamin (VITAMIN B-12) 1000 MCG tablet Take 1,000 mcg by mouth.    diclofenac sodium (VOLTAREN) 1 % Gel     famotidine (PEPCID) 20 MG tablet Take 20 mg by mouth 2 (two) times daily as needed for Heartburn. (Patient not taking: Reported on 4/17/2024)    famotidine (PEPCID) 40 MG tablet Take 40 mg by mouth.    fluticasone propionate (FLONASE) 50 mcg/actuation nasal spray INSTILL ONE SPRAY INTO EACH NOSTRIL ONCE A DAY    HYDROcodone-acetaminophen (NORCO)  mg per tablet     LIDOcaine (LIDODERM) 5 % Place 1 patch onto the skin once daily. Remove & Discard patch within 12 hours or as directed by MD    losartan-hydrochlorothiazide 50-12.5 mg (HYZAAR) 50-12.5 mg per tablet     omeprazole (PRILOSEC) 20 MG capsule Take 20 mg by mouth. (Patient not taking: Reported on 4/17/2024)    RESTASIS 0.05 % ophthalmic emulsion Place 1 drop into both eyes once daily.     Family History       Problem Relation (Age of Onset)    No Known Problems Mother, Father, Sister, Brother, Maternal Aunt, Maternal Uncle, Paternal Aunt, Paternal Uncle, Maternal Grandmother, Maternal Grandfather, Paternal Grandmother,  Paternal Grandfather, Other          Tobacco Use    Smoking status: Never    Smokeless tobacco: Never   Substance and Sexual Activity    Alcohol use: Never    Drug use: Never    Sexual activity: Not on file       Review of Systems   Constitutional: Negative.    HENT: Negative.     Respiratory:  Negative for chest tightness and shortness of breath.    Cardiovascular:  Negative for chest pain, palpitations and leg swelling.   Gastrointestinal: Negative.    Genitourinary: Negative.    Musculoskeletal: Negative.    Neurological: Negative.    Psychiatric/Behavioral: Negative.       Objective:     Vital Signs (Most Recent):  Temp: 98.3 °F (36.8 °C) (06/26/24 0700)  Pulse: 62 (06/26/24 0700)  Resp: 18 (06/26/24 0700)  BP: 136/80 (06/26/24 0700)  SpO2: 100 % (06/26/24 0700) Vital Signs (24h Range):  Temp:  [97.9 °F (36.6 °C)-98.5 °F (36.9 °C)] 98.3 °F (36.8 °C)  Pulse:  [59-79] 62  Resp:  [15-26] 18  SpO2:  [95 %-100 %] 100 %  BP: (125-157)/(69-91) 136/80   Weight: 85.7 kg (189 lb)  Body mass index is 31.45 kg/m².  SpO2: 100 %     No intake or output data in the 24 hours ending 06/26/24 0836  Lines/Drains/Airways       Peripheral Intravenous Line  Duration                  Peripheral IV - Single Lumen 06/25/24 1637 20 G Right Antecubital <1 day                  Significant Labs:  Recent Results (from the past 72 hour(s))   Comprehensive metabolic panel    Collection Time: 06/25/24  4:24 PM   Result Value Ref Range    Sodium 139 136 - 145 mmol/L    Potassium 3.6 3.5 - 5.1 mmol/L    Chloride 106 98 - 107 mmol/L    CO2 24 22 - 29 mmol/L    Glucose 106 (H) 74 - 100 mg/dL    Blood Urea Nitrogen 11.5 9.8 - 20.1 mg/dL    Creatinine 0.71 0.55 - 1.02 mg/dL    Calcium 9.6 8.4 - 10.2 mg/dL    Protein Total 7.4 6.4 - 8.3 gm/dL    Albumin 3.9 3.5 - 5.0 g/dL    Globulin 3.5 2.4 - 3.5 gm/dL    Albumin/Globulin Ratio 1.1 1.1 - 2.0 ratio    Bilirubin Total 0.8 <=1.5 mg/dL     40 - 150 unit/L    ALT 26 0 - 55 unit/L    AST 64 (H) 5 -  34 unit/L    eGFR >60 mL/min/1.73/m2    Anion Gap 9.0 mEq/L    BUN/Creatinine Ratio 16    Troponin I    Collection Time: 06/25/24  4:24 PM   Result Value Ref Range    Troponin-I <0.010 0.000 - 0.045 ng/mL   Brain natriuretic peptide    Collection Time: 06/25/24  4:24 PM   Result Value Ref Range    Natriuretic Peptide 12.4 <=100.0 pg/mL   CBC with Differential    Collection Time: 06/25/24  4:24 PM   Result Value Ref Range    WBC 6.58 4.50 - 11.50 x10(3)/mcL    RBC 4.96 4.20 - 5.40 x10(6)/mcL    Hgb 12.7 12.0 - 16.0 g/dL    Hct 39.1 37.0 - 47.0 %    MCV 78.8 (L) 80.0 - 94.0 fL    MCH 25.6 (L) 27.0 - 31.0 pg    MCHC 32.5 (L) 33.0 - 36.0 g/dL    RDW 16.6 11.5 - 17.0 %    Platelet 216 130 - 400 x10(3)/mcL    MPV 9.9 7.4 - 10.4 fL    Neut % 51.0 %    Lymph % 33.7 %    Mono % 12.8 %    Eos % 2.0 %    Basophil % 0.3 %    Lymph # 2.22 0.6 - 4.6 x10(3)/mcL    Neut # 3.36 2.1 - 9.2 x10(3)/mcL    Mono # 0.84 0.1 - 1.3 x10(3)/mcL    Eos # 0.13 0 - 0.9 x10(3)/mcL    Baso # 0.02 <=0.2 x10(3)/mcL    IG# 0.01 0 - 0.04 x10(3)/mcL    IG% 0.2 %    NRBC% 0.0 %   Troponin I    Collection Time: 06/25/24 11:07 PM   Result Value Ref Range    Troponin-I <0.010 0.000 - 0.045 ng/mL   Comprehensive Metabolic Panel (CMP)    Collection Time: 06/26/24  4:07 AM   Result Value Ref Range    Sodium 143 136 - 145 mmol/L    Potassium 3.9 3.5 - 5.1 mmol/L    Chloride 109 (H) 98 - 107 mmol/L    CO2 25 22 - 29 mmol/L    Glucose 94 74 - 100 mg/dL    Blood Urea Nitrogen 8.3 (L) 9.8 - 20.1 mg/dL    Creatinine 0.67 0.55 - 1.02 mg/dL    Calcium 9.5 8.4 - 10.2 mg/dL    Protein Total 6.9 6.4 - 8.3 gm/dL    Albumin 3.7 3.5 - 5.0 g/dL    Globulin 3.2 2.4 - 3.5 gm/dL    Albumin/Globulin Ratio 1.2 1.1 - 2.0 ratio    Bilirubin Total 2.2 (H) <=1.5 mg/dL     (H) 40 - 150 unit/L     (H) 0 - 55 unit/L     (H) 5 - 34 unit/L    eGFR >60 mL/min/1.73/m2    Anion Gap 9.0 mEq/L    BUN/Creatinine Ratio 12    Troponin I    Collection Time: 06/26/24  4:07 AM    Result Value Ref Range    Troponin-I <0.010 0.000 - 0.045 ng/mL   CBC with Differential    Collection Time: 06/26/24  4:07 AM   Result Value Ref Range    WBC 3.90 (L) 4.50 - 11.50 x10(3)/mcL    RBC 4.85 4.20 - 5.40 x10(6)/mcL    Hgb 12.4 12.0 - 16.0 g/dL    Hct 38.5 37.0 - 47.0 %    MCV 79.4 (L) 80.0 - 94.0 fL    MCH 25.6 (L) 27.0 - 31.0 pg    MCHC 32.2 (L) 33.0 - 36.0 g/dL    RDW 16.7 11.5 - 17.0 %    Platelet 220 130 - 400 x10(3)/mcL    MPV 10.2 7.4 - 10.4 fL    Neut % 56.3 %    Lymph % 28.2 %    Mono % 12.8 %    Eos % 2.1 %    Basophil % 0.3 %    Lymph # 1.10 0.6 - 4.6 x10(3)/mcL    Neut # 2.20 2.1 - 9.2 x10(3)/mcL    Mono # 0.50 0.1 - 1.3 x10(3)/mcL    Eos # 0.08 0 - 0.9 x10(3)/mcL    Baso # 0.01 <=0.2 x10(3)/mcL    IG# 0.01 0 - 0.04 x10(3)/mcL    IG% 0.3 %    NRBC% 0.0 %     Significant Imaging:  Imaging Results              CTA Chest Non-Coronary (PE Studies) (Final result)  Result time 06/25/24 19:15:09      Final result by Henrique Nolasco MD (06/25/24 19:15:09)                   Impression:      No pulmonary embolism identified.      Electronically signed by: Henrique Nolasco  Date:    06/25/2024  Time:    19:15               Narrative:    EXAMINATION:  CTA CHEST NON CORONARY (PE STUDIES)    CLINICAL HISTORY:  Pulmonary embolism (PE) suspected, unknown D-dimer;    TECHNIQUE:  CTA imaging of the chest after IV contrast. Axial, coronal and sagittal reconstructions, including MIP images, are reviewed. Dose length product 412 mGycm. Automatic exposure control, adjustment of mA/kV or iterative reconstruction technique used to limit radiation dose.    COMPARISON:  No relevant comparison studies available at the time of dictation.    FINDINGS:  Diagnostic quality: Adequate    Pulmonary embolism: None identified.    Lung parenchyma: Areas of mild atelectasis in the lower lungs.    Pleural effusion: None.    Mediastinum/fatuma: Normal heart size and thoracic aortic caliber.  No pathologically enlarged lymph  node.    Chest wall/axilla: No significant findings.    Upper abdomen: Previous cholecystectomy and sleeve gastrectomy.    Bones: No acute osseous process.                                       X-Ray Chest 1 View (Final result)  Result time 06/25/24 16:54:14      Final result by Devin Burgos MD (06/25/24 16:54:14)                   Impression:      No acute findings.      Electronically signed by: Devin Burgos  Date:    06/25/2024  Time:    16:54               Narrative:    EXAMINATION:  XR CHEST 1 VIEW    CLINICAL HISTORY:  Chest pain, unspecified    COMPARISON:  15 August 2017    FINDINGS:  Frontal view of the chest was obtained. Heart is not enlarged.  The lungs are grossly clear.  There is no pneumothorax.                                    EKG:     Telemetry:      Physical Exam  Constitutional:       Appearance: Normal appearance.   HENT:      Head: Normocephalic and atraumatic.   Cardiovascular:      Rate and Rhythm: Normal rate and regular rhythm.      Pulses: Normal pulses.      Heart sounds: Normal heart sounds.   Pulmonary:      Effort: Pulmonary effort is normal.      Breath sounds: Normal breath sounds.   Abdominal:      Palpations: Abdomen is soft.   Skin:     General: Skin is warm and dry.      Capillary Refill: Capillary refill takes less than 2 seconds.   Neurological:      Mental Status: She is alert and oriented to person, place, and time.   Psychiatric:         Mood and Affect: Mood normal.         Behavior: Behavior normal.       Home Medications:   No current facility-administered medications on file prior to encounter.     Current Outpatient Medications on File Prior to Encounter   Medication Sig Dispense Refill    acetaminophen (TYLENOL) 325 MG tablet Take 325 mg by mouth every 6 (six) hours as needed for Pain.      ALPRAZolam (XANAX) 1 MG tablet Take 1 mg by mouth every evening. (Patient not taking: Reported on 4/17/2024)      cetirizine (ZYRTEC) 10 MG tablet Take 10 mg by mouth once  daily.      cyanocobalamin (VITAMIN B-12) 1000 MCG tablet Take 1,000 mcg by mouth.      diclofenac sodium (VOLTAREN) 1 % Gel       famotidine (PEPCID) 20 MG tablet Take 20 mg by mouth 2 (two) times daily as needed for Heartburn. (Patient not taking: Reported on 4/17/2024)      famotidine (PEPCID) 40 MG tablet Take 40 mg by mouth.      fluticasone propionate (FLONASE) 50 mcg/actuation nasal spray INSTILL ONE SPRAY INTO EACH NOSTRIL ONCE A DAY      HYDROcodone-acetaminophen (NORCO)  mg per tablet       LIDOcaine (LIDODERM) 5 % Place 1 patch onto the skin once daily. Remove & Discard patch within 12 hours or as directed by MD 10 patch 0    losartan-hydrochlorothiazide 50-12.5 mg (HYZAAR) 50-12.5 mg per tablet       omeprazole (PRILOSEC) 20 MG capsule Take 20 mg by mouth. (Patient not taking: Reported on 4/17/2024)      RESTASIS 0.05 % ophthalmic emulsion Place 1 drop into both eyes once daily.       Current Inpatient Medications:    Current Facility-Administered Medications:     acetaminophen tablet 650 mg, 650 mg, Oral, Q4H PRN, Edmund Leone PA-CHELSIE    aluminum-magnesium hydroxide-simethicone 200-200-20 mg/5 mL suspension 30 mL, 30 mL, Oral, QID PRN, Edmund Leone PA-CHELSIE    cetirizine tablet 10 mg, 10 mg, Oral, Daily, Edmund Leone PA-C    cyanocobalamin tablet 1,000 mcg, 1,000 mcg, Oral, Daily, Edmund Leone PA-CHELSIE    dextrose 10% bolus 125 mL 125 mL, 12.5 g, Intravenous, PRN, Edmund Leone PA-C    dextrose 10% bolus 250 mL 250 mL, 25 g, Intravenous, PRN, Edmund Leone PA-CHELSIE    enoxaparin injection 40 mg, 40 mg, Subcutaneous, Daily, Edmund Leoen PA-CHELSIE, 40 mg at 06/25/24 2330    glucagon (human recombinant) injection 1 mg, 1 mg, Intramuscular, PRN, Edmund Leone PA-CHELSIE    glucose chewable tablet 16 g, 16 g, Oral, PRN, Edmund Leone, PA-C    glucose chewable tablet 24 g, 24 g, Oral, PRN, Edmund Leone, PA-C    melatonin tablet 6 mg, 6 mg, Oral, Nightly PRN, Edmund Leone, PA-C    naloxone 0.4  mg/mL injection 0.02 mg, 0.02 mg, Intravenous, PRN, Edmund Leone PA-C    ondansetron injection 4 mg, 4 mg, Intravenous, Q6H PRN, Edmund Leone PA-C    polyethylene glycol packet 17 g, 17 g, Oral, BID PRN, Edmund Leone PA-C    prochlorperazine injection Soln 5 mg, 5 mg, Intravenous, Q6H PRN, Edmund Leone PA-C    senna-docusate 8.6-50 mg per tablet 1 tablet, 1 tablet, Oral, BID PRN, Edmund Leone PA-C    sodium chloride 0.9% flush 10 mL, 10 mL, Intravenous, Q12H PRN, Edmund Leone PA-C  VTE Risk Mitigation (From admission, onward)           Ordered     enoxaparin injection 40 mg  Daily         06/25/24 2043     IP VTE HIGH RISK PATIENT  Once         06/25/24 2043     Place sequential compression device  Until discontinued         06/25/24 2043                  Assessment:   Chest Pain R/O ACS       -Trop neg x 2       -EKG NSR       -BNP 12.4  Transaminitis (Pt s/p Cholecystectomy 6.20.24)       -AST/ALT-- 962/330    (AST 64 on 6.25 & ALT 26)  Mild Non-Obstructive CAD per LHC 2019        -20-30% 2nd diag  VHD        -Mild-mod MR        -Trace-Mild TR  Small PFO w/ right-to-left shunting per TTE 2019  Unilateral Kidney (Rt) s/p Left Kidney Donation  Hx MANUEL  Hx Palpitations  General Anxiety Disorder  Fibromyalgia      Plan:   Echo Pending  Trend trop x 1 more  Pt ate breakfast and had coffee this am so unable to do Nuclear Stress Test today. Will plan either Nuclear Stress Test or possible LHC prior to d/c  Keep NPO after MN for poss Nuc Stress or LHC  Recheck CMP to reassess Liver functions  Will continue to follow      Thank you for your consult.     Zainab Perez, ROSE  Cardiology  [facility] - 9th Floor Med Surg  06/26/2024

## 2024-06-26 NOTE — ED NOTES
Pt complained of 9/10 chest pain at this time. Obtained EKG and notified MD Dr Gracia at this time.

## 2024-06-26 NOTE — NURSING
Nurses Note -- 4 Eyes      6/26/2024   7:57 AM      Skin assessed during: Admit      [x] No Altered Skin Integrity Present    []Prevention Measures Documented      [] Yes- Altered Skin Integrity Present or Discovered   [] LDA Added if Not in Epic (Describe Wound)   [] New Altered Skin Integrity was Present on Admit and Documented in LDA   [] Wound Image Taken    Wound Care Consulted? No    Attending Nurse:  Alicia Espinosa RN/Staff Member:  Yesica OKEEFE

## 2024-06-26 NOTE — H&P
[facility] Hospital Medicine - H&P Note    Patient Name: Renetta Cornejo  : 1965  MRN: [id number]  PCP: Juan Luis Alford DO  Admitting Physician: Bang Coreas MD  Admission Class: IP- Inpatient   Length of Stay: 0  Face-to-Face encounter date: 2024  Code status: full    Chief Complaint   Chest Pain (Midsternal chest tightness radiating to L side/SOB starting appx 5min PTA. S/P Lap catrachito . 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  Chest pain described as smothering, 10/10, and associated with shortness of breath, sweating, anxiety.  Exacerbated by exertion.  Alleviated by rest and by nitroglycerin given in the ED.  No treatments tried at home and the patient was immediately drove into the ER by a friend.  Patient reports she woke up feeling well this morning without any new complaints.  She denies ever having felt this way before.    Patient received a laparoscopic cholecystectomy 5 days ago 2024 but otherwise has no recent medical changes.    ED course:  VSS on arrival.  GCS 15, cardiopulmonary exam benign.  Troponin and BNP WNL.  EKG normal sinus rhythm.  CTA chest showed no acute findings.  CXR shows no acute abnormality.  ED treated the patient with nitroglycerin, aspirin 325.  Patient also received GI cocktail because of recent cholecystectomy in concern for possible GERD but this did not have any effect.  Cis consulted for the morning to rule out any cardiac origin due to very classic cardiovascular presentation.      ROS   Except as documented, all other systems reviewed and negative     Past Medical History     Past Medical History:   Diagnosis Date     Hypertension        Past Surgical History     Past Surgical History:   Procedure Laterality Date    BACK SURGERY  2016    LAPAROSCOPIC SLEEVE GASTRECTOMY  2019    NEPHRECTOMY Left 2003       Social History     Screening for Social Drivers for health: Patient screened for food insecurity, housing instability, transportation needs, utility   difficulties, and interpersonal safety (select all that apply as identified as concern)  []Housing or Food  []Transportation Needs  []Utility Difficulties  []Interpersonal safety  [x]None    Social History     Tobacco Use    Smoking status: Never    Smokeless tobacco: Never   Substance Use Topics    Alcohol use: Never        Family History   Reviewed and negative    Allergies   Fig and Lisinopril    Home Medications     Prior to Admission medications    Medication Sig Start Date End Date Taking? Authorizing Provider   acetaminophen (TYLENOL) 325 MG tablet Take 325 mg by mouth every 6 (six) hours as needed for Pain.    Provider, Historical   ALPRAZolam (XANAX) 1 MG tablet Take 1 mg by mouth every evening.  Patient not taking: Reported on 4/17/2024 10/9/23   Provider, Historical   cetirizine (ZYRTEC) 10 MG tablet Take 10 mg by mouth once daily. 6/24/22   Provider, Historical   cyanocobalamin (VITAMIN B-12) 1000 MCG tablet Take 1,000 mcg by mouth.    Provider, Historical   diclofenac sodium (VOLTAREN) 1 % Gel  9/13/22   Provider, Historical   famotidine (PEPCID) 20 MG tablet Take 20 mg by mouth 2 (two) times daily as needed for Heartburn.  Patient not taking: Reported on 4/17/2024    Provider, Historical   famotidine (PEPCID) 40 MG tablet Take 40 mg by mouth. 8/1/21   Provider, Historical   fluticasone propionate (FLONASE) 50 mcg/actuation nasal spray INSTILL ONE SPRAY INTO EACH NOSTRIL ONCE A DAY 6/24/22   Provider, Historical   HYDROcodone-acetaminophen (NORCO)  mg per tablet  10/4/22   Provider, Historical   LIDOcaine (LIDODERM) 5 % Place 1 patch onto the skin once daily.  "Remove & Discard patch within 12 hours or as directed by MD 10/26/23   Kumar Smith MD   losartan-hydrochlorothiazide 50-12.5 mg (HYZAAR) 50-12.5 mg per tablet  10/4/22   Provider, Historical   omeprazole (PRILOSEC) 20 MG capsule Take 20 mg by mouth.  Patient not taking: Reported on 4/17/2024    Provider, Historical   RESTASIS 0.05 % ophthalmic emulsion Place 1 drop into both eyes once daily. 5/2/23   Provider, Historical        Physical Exam   Vital Signs  Temp:  [97.9 °F (36.6 °C)]   Pulse:  [63-79]   Resp:  [15-26]   BP: (125-145)/(69-91)   SpO2:  [95 %-100 %]    General: Well developed, well nourished. In no acute distress, non-toxic appearing  HEENT: NC/AT  Neck:  Supple. No JVD  Chest: Clear bilaterally, no wheezing, no accessory muscle use.  CVS: Regular rhythm. Normal S1/S2.  Abdomen: nondistended, normoactive BS, soft and non-tender.  MSK: No obvious deformity or joint swelling  Skin: Warm and dry  Neuro: AAOx3, no focal neurological deficit  Psych: Cooperative      Labs     Recent Labs     06/25/24  1624   WBC 6.58   RBC 4.96   HGB 12.7   HCT 39.1   MCV 78.8*   MCH 25.6*   MCHC 32.5*   RDW 16.6        No results for input(s): "PROTIME", "INR", "PTT", "D-DIMER", "FERRITIN", "IRON", "TRANS", "TIBC", "LABIRON", "ESHTBNOF34", "FOLATE", "LDH", "HAPTOGLOBIN", "RETICCNTAUTO", "RETABS", "PERIPSMEAREV" in the last 72 hours.   Recent Labs     06/25/24  1624      K 3.6   CO2 24   BUN 11.5   CREATININE 0.71   EGFRNORACEVR >60   GLUCOSE 106*   CALCIUM 9.6   ALBUMIN 3.9   GLOBULIN 3.5   ALKPHOS 136   ALT 26   AST 64*   BILITOT 0.8   BNP 12.4     No results for input(s): "LACTIC" in the last 72 hours.  Recent Labs     06/25/24  1624   TROPONINI <0.010        Microbiology Results (last 7 days)       ** No results found for the last 168 hours. **           Imaging     CTA Chest Non-Coronary (PE Studies)   Final Result      No pulmonary embolism identified.         Electronically signed by: Henrique Nloasco "   Date:    06/25/2024   Time:    19:15      X-Ray Chest 1 View   Final Result      No acute findings.         Electronically signed by: Devin Burgos   Date:    06/25/2024   Time:    16:54        Assessment & Plan     1. Chest pain r/o acs   -unclear underlying etiology at this time.  Patient's presentation was classic for a cardiovascular compromise of some sort but her workup has been grossly benign including troponin WNL, EKG NSR, CTA chest and CXR without acute findings.  -continue telemetry and O2 supplementation overnight.  Aspirin daily.  -cardiology will see the patient in the morning.    Additional history:  Unilateral left kidney, osteoporosis, fibromyalgia, generalized anxiety disorder    General Plan:  -Antihypertensives as needed  -Resume home meds as appropriate  -Labs in AM     VTE Prophylaxis: Apolonia, Lovenox     I, Edmund Leone PA-C have reviewed and discussed this case with Dr. Bang Coreas. Please see addendum for further assessment and plan from attending MD.    ________________________________________________________________________  I, Dr. Bang Coreas assumed care of this patient.  For the patient encounter, I performed the substantive portion of the visit, I reviewed the NPPA documentation, treatment plan, and medical decision making.  I had face to face time with this patient.  I have personally reviewed the labs and test results that are presently available. I have reviewed or attempted to review medical records based upon their availability. If patient was admitted under observational status it is with my approval.      58-year-old male underwent lap cholecystectomy on 06/20/2024 presents with left-sided chest pain, reportedly responded to nitroglycerin.  Denies any history of coronary artery disease.  The time my assessment chest pain resolved mostly complaining of abdominal pain.  EKG was nonischemic and troponins have trended undetectable.  CTA chest and chest x-ray  unremarkable.  We will get a CT abdomen and pelvis, finish trending cardiac enzymes.    Time seen: 11PM 6/25  Bang Coreas MD

## 2024-06-27 LAB
ALBUMIN SERPL-MCNC: 3.7 G/DL (ref 3.5–5)
ALBUMIN/GLOB SERPL: 1 RATIO (ref 1.1–2)
ALP SERPL-CCNC: 273 UNIT/L (ref 40–150)
ALT SERPL-CCNC: 814 UNIT/L (ref 0–55)
ANION GAP SERPL CALC-SCNC: 10 MEQ/L
AST SERPL-CCNC: 1224 UNIT/L (ref 5–34)
BASOPHILS # BLD AUTO: 0.03 X10(3)/MCL
BASOPHILS NFR BLD AUTO: 0.8 %
BILIRUB DIRECT SERPL-MCNC: 4.3 MG/DL (ref 0–?)
BILIRUB SERPL-MCNC: 6.1 MG/DL
BUN SERPL-MCNC: 8.4 MG/DL (ref 9.8–20.1)
CALCIUM SERPL-MCNC: 9.8 MG/DL (ref 8.4–10.2)
CHLORIDE SERPL-SCNC: 106 MMOL/L (ref 98–107)
CO2 SERPL-SCNC: 27 MMOL/L (ref 22–29)
CREAT SERPL-MCNC: 0.64 MG/DL (ref 0.55–1.02)
CREAT/UREA NIT SERPL: 13
EOSINOPHIL # BLD AUTO: 0.18 X10(3)/MCL (ref 0–0.9)
EOSINOPHIL NFR BLD AUTO: 4.9 %
ERYTHROCYTE [DISTWIDTH] IN BLOOD BY AUTOMATED COUNT: 17 % (ref 11.5–17)
GFR SERPLBLD CREATININE-BSD FMLA CKD-EPI: >60 ML/MIN/1.73/M2
GLOBULIN SER-MCNC: 3.7 GM/DL (ref 2.4–3.5)
GLUCOSE SERPL-MCNC: 91 MG/DL (ref 74–100)
HCT VFR BLD AUTO: 40.6 % (ref 37–47)
HGB BLD-MCNC: 13.1 G/DL (ref 12–16)
IMM GRANULOCYTES # BLD AUTO: 0.01 X10(3)/MCL (ref 0–0.04)
IMM GRANULOCYTES NFR BLD AUTO: 0.3 %
LYMPHOCYTES # BLD AUTO: 0.92 X10(3)/MCL (ref 0.6–4.6)
LYMPHOCYTES NFR BLD AUTO: 25.2 %
MAGNESIUM SERPL-MCNC: 1.7 MG/DL (ref 1.6–2.6)
MCH RBC QN AUTO: 25.6 PG (ref 27–31)
MCHC RBC AUTO-ENTMCNC: 32.3 G/DL (ref 33–36)
MCV RBC AUTO: 79.3 FL (ref 80–94)
MONOCYTES # BLD AUTO: 0.47 X10(3)/MCL (ref 0.1–1.3)
MONOCYTES NFR BLD AUTO: 12.9 %
NEUTROPHILS # BLD AUTO: 2.04 X10(3)/MCL (ref 2.1–9.2)
NEUTROPHILS NFR BLD AUTO: 55.9 %
NRBC BLD AUTO-RTO: 0 %
PLATELET # BLD AUTO: 212 X10(3)/MCL (ref 130–400)
PMV BLD AUTO: 10.1 FL (ref 7.4–10.4)
POTASSIUM SERPL-SCNC: 3.8 MMOL/L (ref 3.5–5.1)
PROT SERPL-MCNC: 7.4 GM/DL (ref 6.4–8.3)
RBC # BLD AUTO: 5.12 X10(6)/MCL (ref 4.2–5.4)
SODIUM SERPL-SCNC: 143 MMOL/L (ref 136–145)
TROPONIN I SERPL-MCNC: <0.01 NG/ML (ref 0–0.04)
WBC # BLD AUTO: 3.65 X10(3)/MCL (ref 4.5–11.5)

## 2024-06-27 PROCEDURE — 36415 COLL VENOUS BLD VENIPUNCTURE: CPT | Performed by: INTERNAL MEDICINE

## 2024-06-27 PROCEDURE — 21400001 HC TELEMETRY ROOM

## 2024-06-27 PROCEDURE — 85025 COMPLETE CBC W/AUTO DIFF WBC: CPT

## 2024-06-27 PROCEDURE — 84484 ASSAY OF TROPONIN QUANT: CPT | Performed by: NURSE PRACTITIONER

## 2024-06-27 PROCEDURE — A9577 INJ MULTIHANCE: HCPCS | Performed by: INTERNAL MEDICINE

## 2024-06-27 PROCEDURE — A9537 TC99M MEBROFENIN: HCPCS | Performed by: INTERNAL MEDICINE

## 2024-06-27 PROCEDURE — 25000003 PHARM REV CODE 250: Performed by: INTERNAL MEDICINE

## 2024-06-27 PROCEDURE — 80053 COMPREHEN METABOLIC PANEL: CPT

## 2024-06-27 PROCEDURE — 63600175 PHARM REV CODE 636 W HCPCS: Performed by: NURSE PRACTITIONER

## 2024-06-27 PROCEDURE — 63600175 PHARM REV CODE 636 W HCPCS

## 2024-06-27 PROCEDURE — 63600175 PHARM REV CODE 636 W HCPCS: Performed by: INTERNAL MEDICINE

## 2024-06-27 PROCEDURE — 82248 BILIRUBIN DIRECT: CPT | Performed by: INTERNAL MEDICINE

## 2024-06-27 PROCEDURE — 25000003 PHARM REV CODE 250

## 2024-06-27 PROCEDURE — 83735 ASSAY OF MAGNESIUM: CPT | Performed by: INTERNAL MEDICINE

## 2024-06-27 PROCEDURE — 25500020 PHARM REV CODE 255: Performed by: INTERNAL MEDICINE

## 2024-06-27 PROCEDURE — 11000001 HC ACUTE MED/SURG PRIVATE ROOM

## 2024-06-27 RX ORDER — KIT FOR THE PREPARATION OF TECHNETIUM TC 99M MEBROFENIN 45 MG/10ML
8.8 INJECTION, POWDER, LYOPHILIZED, FOR SOLUTION INTRAVENOUS
Status: COMPLETED | OUTPATIENT
Start: 2024-06-27 | End: 2024-06-27

## 2024-06-27 RX ORDER — ACETAMINOPHEN 10 MG/ML
1000 INJECTION, SOLUTION INTRAVENOUS ONCE
Status: COMPLETED | OUTPATIENT
Start: 2024-06-27 | End: 2024-06-27

## 2024-06-27 RX ADMIN — MORPHINE SULFATE 2 MG: 4 INJECTION INTRAVENOUS at 10:06

## 2024-06-27 RX ADMIN — ACETAMINOPHEN 650 MG: 325 TABLET, FILM COATED ORAL at 04:06

## 2024-06-27 RX ADMIN — MEBROFENIN 8.8 MILLICURIE: 45 INJECTION, POWDER, LYOPHILIZED, FOR SOLUTION INTRAVENOUS at 07:06

## 2024-06-27 RX ADMIN — FAMOTIDINE 20 MG: 20 TABLET, FILM COATED ORAL at 08:06

## 2024-06-27 RX ADMIN — ONDANSETRON 4 MG: 2 INJECTION INTRAMUSCULAR; INTRAVENOUS at 11:06

## 2024-06-27 RX ADMIN — OXYCODONE HYDROCHLORIDE 5 MG: 5 TABLET ORAL at 12:06

## 2024-06-27 RX ADMIN — GADOBENATE DIMEGLUMINE 17 ML: 529 INJECTION, SOLUTION INTRAVENOUS at 03:06

## 2024-06-27 RX ADMIN — ACETAMINOPHEN 1000 MG: 10 INJECTION, SOLUTION INTRAVENOUS at 05:06

## 2024-06-27 RX ADMIN — OXYCODONE HYDROCHLORIDE 5 MG: 5 TABLET ORAL at 08:06

## 2024-06-27 NOTE — PROGRESS NOTES
Acute Care Surgery   Progress Note  Admit Date: 6/25/2024  HD#2  POD#* No surgery found *    Subjective:   Interval history:    Complains of some abdominal pain  No nausea/vomiting  Voiding  Passing gas, no BM    Home Meds:  Current Outpatient Medications   Medication Instructions    acetaminophen (TYLENOL) 325 mg, Oral, Every 6 hours PRN    ALPRAZolam (XANAX) 1 mg, Nightly    cetirizine (ZYRTEC) 10 mg, Oral, Daily    cyanocobalamin (VITAMIN B-12) 1,000 mcg, Oral    diclofenac sodium (VOLTAREN) 1 % Gel No dose, route, or frequency recorded.    famotidine (PEPCID) 40 mg, Oral    famotidine (PEPCID) 20 mg, 2 times daily PRN    fluticasone propionate (FLONASE) 50 mcg/actuation nasal spray INSTILL ONE SPRAY INTO EACH NOSTRIL ONCE A DAY    HYDROcodone-acetaminophen (NORCO)  mg per tablet No dose, route, or frequency recorded.    LIDOcaine (LIDODERM) 5 % 1 patch, Transdermal, Daily, Remove & Discard patch within 12 hours or as directed by MD    losartan-hydrochlorothiazide 50-12.5 mg (HYZAAR) 50-12.5 mg per tablet No dose, route, or frequency recorded.    omeprazole (PRILOSEC) 20 mg    RESTASIS 0.05 % ophthalmic emulsion 1 drop, Both Eyes, Daily      Scheduled Meds:   aspirin  81 mg Oral Daily    cetirizine  10 mg Oral Daily    cyanocobalamin  1,000 mcg Oral Daily    docusate sodium  50 mg Oral Daily    enoxparin  40 mg Subcutaneous Daily    famotidine  20 mg Oral BID     Continuous Infusions:  PRN Meds:  Current Facility-Administered Medications:     acetaminophen, 650 mg, Oral, Q4H PRN    aluminum-magnesium hydroxide-simethicone, 30 mL, Oral, QID PRN    dextrose 10%, 12.5 g, Intravenous, PRN    dextrose 10%, 25 g, Intravenous, PRN    glucagon (human recombinant), 1 mg, Intramuscular, PRN    glucose, 16 g, Oral, PRN    glucose, 24 g, Oral, PRN    melatonin, 6 mg, Oral, Nightly PRN    morphine, 2 mg, Intravenous, Q8H PRN    naloxone, 0.02 mg, Intravenous, PRN    ondansetron, 4 mg, Intravenous, Q6H PRN     "oxyCODONE, 5 mg, Oral, Q6H PRN    oxyCODONE, 10 mg, Oral, Q6H PRN    polyethylene glycol, 17 g, Oral, BID PRN    prochlorperazine, 5 mg, Intravenous, Q6H PRN    senna-docusate 8.6-50 mg, 1 tablet, Oral, BID PRN    sodium chloride 0.9%, 10 mL, Intravenous, Q12H PRN     Objective:     VITAL SIGNS: 24 HR MIN & MAX LAST   Temp  Min: 97.8 °F (36.6 °C)  Max: 98.4 °F (36.9 °C)  98.4 °F (36.9 °C)   BP  Min: 111/70  Max: 146/73  134/84    Pulse  Min: 64  Max: 74  65    Resp  Min: 16  Max: 16  16    SpO2  Min: 97 %  Max: 99 %  99 %      HT: 5' 5" (165.1 cm)  WT: 85.7 kg (189 lb)  BMI: 31.5     Intake/output:  Intake/Output - Last 3 Shifts         06/25 0700 06/26 0659 06/26 0700 06/27 0659 06/27 0700 06/28 0659    P.O.  200     Total Intake(mL/kg)  200 (2.3)     Net  +200            Urine Occurrence 2 x 3 x             Intake/Output Summary (Last 24 hours) at 6/27/2024 1313  Last data filed at 6/26/2024 2110  Gross per 24 hour   Intake 200 ml   Output --   Net 200 ml           Lines/drains/airway:       Peripheral IV - Single Lumen 06/25/24 1637 20 G Right Antecubital (Active)   Site Assessment Clean;Dry;Intact 06/27/24 1110   Extremity Assessment Distal to IV No abnormal discoloration 06/27/24 0746   Line Status Saline locked 06/27/24 1110   Dressing Status Clean;Dry;Intact 06/27/24 1110   Dressing Intervention Integrity maintained 06/27/24 0746   Number of days: 1       Physical examination:  Gen: NAD, AAOx3, answering questions appropriately  Ext: moving all extremities spontaneously and purposefully  In bathroom at time of rounds, and discussed symptoms but was unable to examine. Pt later down in HIDA scan, again unable to examine.    Labs:  Renal:  Recent Labs     06/25/24  1624 06/26/24  0407 06/26/24  1034 06/27/24  0428   BUN 11.5 8.3* 6.3* 8.4*   CREATININE 0.71 0.67 0.65 0.64     No results for input(s): "LACTIC" in the last 72 hours.  ROXIE:  Recent Labs     06/25/24  1624 06/26/24  0407 06/26/24  1034 " 06/27/24 0428    143 143 143   K 3.6 3.9 3.9 3.8    109* 108* 106   CO2 24 25 25 27   CALCIUM 9.6 9.5 9.2 9.8   MG  --   --   --  1.70   ALBUMIN 3.9 3.7 3.8 3.7   BILITOT 0.8 2.2* 2.6* 6.1*   AST 64* 962* 1,210* 1,224*   ALKPHOS 136 206* 229* 273*   ALT 26 330* 512* 814*     Heme:  Recent Labs     06/25/24  1624 06/26/24  0407 06/27/24 0428   HGB 12.7 12.4 13.1   HCT 39.1 38.5 40.6    220 212     ID:  Recent Labs     06/25/24 1624 06/26/24  0407 06/27/24  0428   WBC 6.58 3.90* 3.65*     CBG:  Recent Labs     06/25/24 1624 06/26/24  0407 06/26/24  1034 06/27/24  0428   GLUCOSE 106* 94 91 91      Cardiovascular:  Recent Labs   Lab 06/25/24  1624 06/25/24  2307 06/26/24  0407 06/26/24  1034 06/27/24  0428   TROPONINI <0.010   < > <0.010 <0.010 <0.010   BNP 12.4  --   --   --   --     < > = values in this interval not displayed.     I have reviewed all pertinent lab results within the past 24 hours.    Imaging:  NM Hepatobiliary Scan (HIDA)   Final Result      1.  Negative for bile leak.      2.  Lack of biliary to bowel radioisotope labeled bile clearance is consistent with high grade common bile duct obstruction.  This may be secondary to choledocholithiasis.         Electronically signed by: Jonathan Campuzano   Date:    06/27/2024   Time:    12:13      CT Abdomen Pelvis With IV Contrast NO Oral Contrast   Final Result      1. Stranding along the right hepatic lobe and pericolic gutter likely postsurgical given recent cholecystectomy.   2. Biliary ductal dilatation may be secondary to cholecystectomy but recommend correlation with cholestatic parameters.   3. Question gastritis.         Electronically signed by: Devin Burgos   Date:    06/26/2024   Time:    08:58      CTA Chest Non-Coronary (PE Studies)   Final Result      No pulmonary embolism identified.         Electronically signed by: Henrique Nolasco   Date:    06/25/2024   Time:    19:15      X-Ray Chest 1 View   Final Result      No acute  findings.         Electronically signed by: Devin Burgos   Date:    06/25/2024   Time:    16:54      MRI Abdomen W WO Contrast_INC MRCP (XPD)    (Results Pending)      I have reviewed all pertinent imaging results/findings within the past 24 hours.    Micro/Path/Other:  Microbiology Results (last 7 days)       ** No results found for the last 168 hours. **           Pathology Results  (Last 7 days)      None             Assessment & Plan:   58 y.o. female  with past medical history of HTN and s/p lap catrachito 6/20 at [facility] who presented yesterday with chest pain. Cardiac work up negative. Patient pain now mostly RUQ and radiates to the back. General surgery consulted for evaluation.     - HIDA with evidence of high grade biliary obstruction.  - Awaiting MRCP to determine cause of obstruction  - Recommend GI consult for ERCP if stone is found to be reason for obstruction  - PRN pain and nausea   - bowel regimen  - Will continue to follow     Zainab Vaca MD  [employer] General Surgery, PGY-4

## 2024-06-27 NOTE — CONSULTS
Gastroenterology Consultation Note    Reason for Consult:  The primary encounter diagnosis was Chest pain. Diagnoses of Chest pain, unspecified type and Right upper quadrant abdominal pain were also pertinent to this visit.    GI consulted today for biliary obstruction    PCP:   Anjelica Cardenas FNP    Referring MD:  Self, Trinity  No address on file    Hospital Day: 2     Initial History of Present Illness (HPI):  This is a 58 y.o. female (patient of Dr. Tran) presented to [facility] on 6/25/24 for chest pain, diaphoresis, anxiety, and SOB. She had a lap catrachito on 6/20/24 with Dr. Niño due to cholelithiasis with mild chronic cholecystitis.  Operative report does not mention Intraoperative cholangiogram. There is note of abdominal adhesions.     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 There was biliary ductal dilatation secondary to cholecystectomy and gastritis was suspected.    HIDA 6/26/24 was negative for bile leak but consistent with CBD obstruction     MRI w/wo 6/27/24: Impression:  1. Intra and extrahepatic biliary ductal dilatation with tapering at the pancreatic head but no obstructing lesion seen.  2. Status post cholecystectomy with mild stranding along the gallbladder fossa.    Her LFTs and T bilirubin did trend down significantly today.     No epigastric, abdominal pain today    Denies GI complaints generally except occasional reflux.   Acute onset of epigastric pain prior to admission but this has resolved by today      ROS:  Review of Systems   Constitutional:  Negative for chills, fever and weight loss.   HENT:  Negative for hearing loss.    Eyes:  Negative for  blurred vision.   Respiratory:  Negative for cough, shortness of breath and wheezing.    Cardiovascular:  Negative for chest pain and leg swelling.   Gastrointestinal:  Negative for abdominal pain, constipation, diarrhea, heartburn, nausea and vomiting.   Genitourinary:  Negative for dysuria.   Musculoskeletal:  Negative for myalgias.   Skin:  Negative for rash.   Neurological:  Negative for dizziness, weakness and headaches.   Psychiatric/Behavioral:  Negative for depression and memory loss. The patient is not nervous/anxious.        Medical History:   Past Medical History:   Diagnosis Date    Hypertension        Surgical History:   Past Surgical History:   Procedure Laterality Date    BACK SURGERY  2016    LAPAROSCOPIC SLEEVE GASTRECTOMY  2019    NEPHRECTOMY Left 2003       Family History:   Family History   Problem Relation Name Age of Onset    No Known Problems Mother      No Known Problems Father      No Known Problems Sister      No Known Problems Brother      No Known Problems Maternal Aunt      No Known Problems Maternal Uncle      No Known Problems Paternal Aunt      No Known Problems Paternal Uncle      No Known Problems Maternal Grandmother      No Known Problems Maternal Grandfather      No Known Problems Paternal Grandmother      No Known Problems Paternal Grandfather      No Known Problems Other      Anesthesia problems Neg Hx      Broken bones Neg Hx      Cancer Neg Hx      Clotting disorder Neg Hx      Collagen disease Neg Hx      Diabetes Neg Hx      Dislocations Neg Hx      Osteoporosis Neg Hx      Rheumatologic disease Neg Hx      Scoliosis Neg Hx      Severe sprains Neg Hx     .     Social History:   Social History     Tobacco Use    Smoking status: Never    Smokeless tobacco: Never   Substance Use Topics    Alcohol use: Never       Allergies:  Review of patient's allergies indicates:   Allergen Reactions    Fig Swelling    Lisinopril Other (See Comments)       Medications Prior to Admission  "  Medication Sig Dispense Refill Last Dose    acetaminophen (TYLENOL) 325 MG tablet Take 325 mg by mouth every 6 (six) hours as needed for Pain.       ALPRAZolam (XANAX) 1 MG tablet Take 1 mg by mouth every evening. (Patient not taking: Reported on 4/17/2024)       cetirizine (ZYRTEC) 10 MG tablet Take 10 mg by mouth once daily.       cyanocobalamin (VITAMIN B-12) 1000 MCG tablet Take 1,000 mcg by mouth.       diclofenac sodium (VOLTAREN) 1 % Gel        famotidine (PEPCID) 20 MG tablet Take 20 mg by mouth 2 (two) times daily as needed for Heartburn. (Patient not taking: Reported on 4/17/2024)       famotidine (PEPCID) 40 MG tablet Take 40 mg by mouth.       fluticasone propionate (FLONASE) 50 mcg/actuation nasal spray INSTILL ONE SPRAY INTO EACH NOSTRIL ONCE A DAY       HYDROcodone-acetaminophen (NORCO)  mg per tablet        LIDOcaine (LIDODERM) 5 % Place 1 patch onto the skin once daily. Remove & Discard patch within 12 hours or as directed by MD 10 patch 0     losartan-hydrochlorothiazide 50-12.5 mg (HYZAAR) 50-12.5 mg per tablet        omeprazole (PRILOSEC) 20 MG capsule Take 20 mg by mouth. (Patient not taking: Reported on 4/17/2024)       RESTASIS 0.05 % ophthalmic emulsion Place 1 drop into both eyes once daily.            Objective Findings:    Vital Signs:  /84   Pulse 65   Temp 98.4 °F (36.9 °C)   Resp 16   Ht 5' 5" (1.651 m)   Wt 85.7 kg (189 lb)   SpO2 99%   BMI 31.45 kg/m²   Body mass index is 31.45 kg/m².    Physical Exam:  Physical Exam  Constitutional:       General: She is not in acute distress.     Appearance: Normal appearance. She is not ill-appearing.   HENT:      Head: Normocephalic.      Mouth/Throat:      Mouth: Mucous membranes are moist.      Pharynx: Oropharynx is clear.   Eyes:      General: No scleral icterus.     Pupils: Pupils are equal, round, and reactive to light.   Cardiovascular:      Rate and Rhythm: Normal rate and regular rhythm.      Pulses: Normal pulses.    "   Heart sounds: Normal heart sounds. No murmur heard.  Pulmonary:      Effort: Pulmonary effort is normal. No respiratory distress.      Breath sounds: Normal breath sounds. No wheezing.   Abdominal:      General: Bowel sounds are normal. There is no distension.      Palpations: Abdomen is soft.      Tenderness: There is no abdominal tenderness. There is no guarding.   Musculoskeletal:         General: No swelling or tenderness. Normal range of motion.   Skin:     General: Skin is warm and dry.      Coloration: Skin is not jaundiced.   Neurological:      Mental Status: She is alert and oriented to person, place, and time.      Motor: No weakness.   Psychiatric:         Mood and Affect: Mood normal.         Behavior: Behavior normal.         Labs:  Recent Results (from the past 48 hour(s))   EKG 12-lead    Collection Time: 06/25/24  7:33 PM   Result Value Ref Range    QRS Duration 74 ms    [facility] QTC Calculation 441 ms   Troponin I    Collection Time: 06/25/24 11:07 PM   Result Value Ref Range    Troponin-I <0.010 0.000 - 0.045 ng/mL   Comprehensive Metabolic Panel (CMP)    Collection Time: 06/26/24  4:07 AM   Result Value Ref Range    Sodium 143 136 - 145 mmol/L    Potassium 3.9 3.5 - 5.1 mmol/L    Chloride 109 (H) 98 - 107 mmol/L    CO2 25 22 - 29 mmol/L    Glucose 94 74 - 100 mg/dL    Blood Urea Nitrogen 8.3 (L) 9.8 - 20.1 mg/dL    Creatinine 0.67 0.55 - 1.02 mg/dL    Calcium 9.5 8.4 - 10.2 mg/dL    Protein Total 6.9 6.4 - 8.3 gm/dL    Albumin 3.7 3.5 - 5.0 g/dL    Globulin 3.2 2.4 - 3.5 gm/dL    Albumin/Globulin Ratio 1.2 1.1 - 2.0 ratio    Bilirubin Total 2.2 (H) <=1.5 mg/dL     (H) 40 - 150 unit/L     (H) 0 - 55 unit/L     (H) 5 - 34 unit/L    eGFR >60 mL/min/1.73/m2    Anion Gap 9.0 mEq/L    BUN/Creatinine Ratio 12    Troponin I    Collection Time: 06/26/24  4:07 AM   Result Value Ref Range    Troponin-I <0.010 0.000 - 0.045 ng/mL   CBC with Differential    Collection Time: 06/26/24  4:07 AM    Result Value Ref Range    WBC 3.90 (L) 4.50 - 11.50 x10(3)/mcL    RBC 4.85 4.20 - 5.40 x10(6)/mcL    Hgb 12.4 12.0 - 16.0 g/dL    Hct 38.5 37.0 - 47.0 %    MCV 79.4 (L) 80.0 - 94.0 fL    MCH 25.6 (L) 27.0 - 31.0 pg    MCHC 32.2 (L) 33.0 - 36.0 g/dL    RDW 16.7 11.5 - 17.0 %    Platelet 220 130 - 400 x10(3)/mcL    MPV 10.2 7.4 - 10.4 fL    Neut % 56.3 %    Lymph % 28.2 %    Mono % 12.8 %    Eos % 2.1 %    Basophil % 0.3 %    Lymph # 1.10 0.6 - 4.6 x10(3)/mcL    Neut # 2.20 2.1 - 9.2 x10(3)/mcL    Mono # 0.50 0.1 - 1.3 x10(3)/mcL    Eos # 0.08 0 - 0.9 x10(3)/mcL    Baso # 0.01 <=0.2 x10(3)/mcL    IG# 0.01 0 - 0.04 x10(3)/mcL    IG% 0.3 %    NRBC% 0.0 %   EKG 12-lead    Collection Time: 06/26/24  7:55 AM   Result Value Ref Range    QRS Duration 72 ms    [facility] QTC Calculation 434 ms   Troponin I    Collection Time: 06/26/24 10:34 AM   Result Value Ref Range    Troponin-I <0.010 0.000 - 0.045 ng/mL   Comprehensive Metabolic Panel    Collection Time: 06/26/24 10:34 AM   Result Value Ref Range    Sodium 143 136 - 145 mmol/L    Potassium 3.9 3.5 - 5.1 mmol/L    Chloride 108 (H) 98 - 107 mmol/L    CO2 25 22 - 29 mmol/L    Glucose 91 74 - 100 mg/dL    Blood Urea Nitrogen 6.3 (L) 9.8 - 20.1 mg/dL    Creatinine 0.65 0.55 - 1.02 mg/dL    Calcium 9.2 8.4 - 10.2 mg/dL    Protein Total 7.1 6.4 - 8.3 gm/dL    Albumin 3.8 3.5 - 5.0 g/dL    Globulin 3.3 2.4 - 3.5 gm/dL    Albumin/Globulin Ratio 1.2 1.1 - 2.0 ratio    Bilirubin Total 2.6 (H) <=1.5 mg/dL     (H) 40 - 150 unit/L     (H) 0 - 55 unit/L    AST 1,210 (H) 5 - 34 unit/L    eGFR >60 mL/min/1.73/m2    Anion Gap 10.0 mEq/L    BUN/Creatinine Ratio 10    Echo    Collection Time: 06/26/24 11:49 AM   Result Value Ref Range    BSA 1.98 m2    Beaulieu's Biplane MOD Ejection Fraction 56 %    A2C EF 53 %    A4C EF 59 %    LVOT stroke volume 64.06 cm3    LVIDd 4.90 3.5 - 6.0 cm    LV Systolic Volume 40.96 mL    LV Systolic Volume Index 21.2 mL/m2    LVIDs 3.20 2.1 - 4.0 cm     LV ESV A2C 58.00 mL    LV Diastolic Volume 112.81 mL    LV ESV A4C 45.50 mL    LV Diastolic Volume Index 58.45 mL/m2    LV EDV A2C 83.893709235175756 mL    LV EDV A4C 102.00 mL    Left Ventricular End Systolic Volume by Teichholz Method 40.96 mL    Left Ventricular End Diastolic Volume by Teichholz Method 112.81 mL    IVS 0.80 0.6 - 1.1 cm    LVOT diameter 2.00 cm    LVOT area 3.1 cm2    FS 35 28 - 44 %    Left Ventricle Relative Wall Thickness 0.37 cm    Posterior Wall 0.90 0.6 - 1.1 cm    LV mass 141.91 g    LV Mass Index 74 g/m2    MV Peak E Santos 0.69 m/s    TDI LATERAL 0.15 m/s    TDI SEPTAL 0.09 m/s    E/E' ratio 5.75 m/s    MV Peak A Santos 0.50 m/s    TR Max Santos 1.9 m/s    E/A ratio 1.38     E wave deceleration time 195.00 msec    LV SEPTAL E/E' RATIO 7.67 m/s    LV LATERAL E/E' RATIO 4.60 m/s    LVOT peak santos 1.01 m/s    Left Ventricular Outflow Tract Mean Velocity 0.63 cm/s    Left Ventricular Outflow Tract Mean Gradient 2.00 mmHg    TAPSE 2.43 cm    LA size 3.60 cm    LA volume (mod) 51.20 cm3    LA Volume Index (Mod) 26.5 mL/m2    AV mean gradient 3 mmHg    AV peak gradient 5 mmHg    Ao peak santos 1.16 m/s    Ao VTI 26.90 cm    LVOT peak VTI 20.40 cm    AV valve area 2.38 cm²    AV Velocity Ratio 0.87     AV index (prosthetic) 0.76     SUNDAY by Velocity Ratio 2.73 cm²    MV mean gradient 1 mmHg    MV peak gradient 2 mmHg    MV stenosis pressure 1/2 time 64.00 ms    MV valve area p 1/2 method 3.44 cm2    MV valve area by continuity eq 2.44 cm2    MV VTI 26.3 cm    Triscuspid Valve Regurgitation Peak Gradient 14 mmHg    Mean e' 0.12 m/s    ZLVIDS -0.39     ZLVIDD -1.09     LA area A4C 17.90 cm2    LA area A2C 19.40 cm2    Mitral Valve Heart Rate 55 bpm    TV resting pulmonary artery pressure 17 mmHg    RV TB RVSP 5 mmHg    Est. RA pres 3 mmHg    Sinus 3.2 cm   Comprehensive Metabolic Panel (CMP)    Collection Time: 06/27/24  4:28 AM   Result Value Ref Range    Sodium 143 136 - 145 mmol/L    Potassium 3.8 3.5 - 5.1  mmol/L    Chloride 106 98 - 107 mmol/L    CO2 27 22 - 29 mmol/L    Glucose 91 74 - 100 mg/dL    Blood Urea Nitrogen 8.4 (L) 9.8 - 20.1 mg/dL    Creatinine 0.64 0.55 - 1.02 mg/dL    Calcium 9.8 8.4 - 10.2 mg/dL    Protein Total 7.4 6.4 - 8.3 gm/dL    Albumin 3.7 3.5 - 5.0 g/dL    Globulin 3.7 (H) 2.4 - 3.5 gm/dL    Albumin/Globulin Ratio 1.0 (L) 1.1 - 2.0 ratio    Bilirubin Total 6.1 (H) <=1.5 mg/dL     (H) 40 - 150 unit/L     (H) 0 - 55 unit/L    AST 1,224 (H) 5 - 34 unit/L    eGFR >60 mL/min/1.73/m2    Anion Gap 10.0 mEq/L    BUN/Creatinine Ratio 13    Magnesium    Collection Time: 06/27/24  4:28 AM   Result Value Ref Range    Magnesium Level 1.70 1.60 - 2.60 mg/dL   Troponin I    Collection Time: 06/27/24  4:28 AM   Result Value Ref Range    Troponin-I <0.010 0.000 - 0.045 ng/mL   Bilirubin, Direct    Collection Time: 06/27/24  4:28 AM   Result Value Ref Range    Bilirubin Direct 4.3 (H) 0.0 - <0.5 mg/dL   CBC with Differential    Collection Time: 06/27/24  4:28 AM   Result Value Ref Range    WBC 3.65 (L) 4.50 - 11.50 x10(3)/mcL    RBC 5.12 4.20 - 5.40 x10(6)/mcL    Hgb 13.1 12.0 - 16.0 g/dL    Hct 40.6 37.0 - 47.0 %    MCV 79.3 (L) 80.0 - 94.0 fL    MCH 25.6 (L) 27.0 - 31.0 pg    MCHC 32.3 (L) 33.0 - 36.0 g/dL    RDW 17.0 11.5 - 17.0 %    Platelet 212 130 - 400 x10(3)/mcL    MPV 10.1 7.4 - 10.4 fL    Neut % 55.9 %    Lymph % 25.2 %    Mono % 12.9 %    Eos % 4.9 %    Basophil % 0.8 %    Lymph # 0.92 0.6 - 4.6 x10(3)/mcL    Neut # 2.04 (L) 2.1 - 9.2 x10(3)/mcL    Mono # 0.47 0.1 - 1.3 x10(3)/mcL    Eos # 0.18 0 - 0.9 x10(3)/mcL    Baso # 0.03 <=0.2 x10(3)/mcL    IG# 0.01 0 - 0.04 x10(3)/mcL    IG% 0.3 %    NRBC% 0.0 %       MRI Abdomen W WO Contrast_INC MRCP (XPD)   Final Result      1. Intra and extrahepatic biliary ductal dilatation with tapering at the pancreatic head but no obstructing lesion seen.   2. Status post cholecystectomy with mild stranding along the gallbladder fossa.          Electronically signed by: Devin Burgos   Date:    06/27/2024   Time:    16:10      NM Hepatobiliary Scan (HIDA)   Final Result      1.  Negative for bile leak.      2.  Lack of biliary to bowel radioisotope labeled bile clearance is consistent with high grade common bile duct obstruction.  This may be secondary to choledocholithiasis.         Electronically signed by: Jonathan Campuzano   Date:    06/27/2024   Time:    12:13      CT Abdomen Pelvis With IV Contrast NO Oral Contrast   Final Result      1. Stranding along the right hepatic lobe and pericolic gutter likely postsurgical given recent cholecystectomy.   2. Biliary ductal dilatation may be secondary to cholecystectomy but recommend correlation with cholestatic parameters.   3. Question gastritis.         Electronically signed by: Devin Burgos   Date:    06/26/2024   Time:    08:58      CTA Chest Non-Coronary (PE Studies)   Final Result      No pulmonary embolism identified.         Electronically signed by: Henrique Nolasco   Date:    06/25/2024   Time:    19:15      X-Ray Chest 1 View   Final Result      No acute findings.         Electronically signed by: Devin Burgos   Date:    06/25/2024   Time:    16:54          Endoscopy:    10/1/2015 - COLONOSCOPY- Dr. CARLOS Beckett- Normal     Previous EGD with Dr. Tran- reflux only per patient    Assessment/Plan:  Choledocholithiasis   ERCP today  S/p lap catrachito    Dr. Tran patient    Thank you for allowing us to participate in the care of Renetta Cornejo.    Pily Lewis NP acting as scribe for Dr. Devin Darling MD

## 2024-06-27 NOTE — PROGRESS NOTES
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  Chest pain described as smothering, 10/10, and associated with shortness of breath, sweating, anxiety.  Exacerbated by exertion.  Alleviated by rest and by nitroglycerin given in the ED.  No treatments tried at home and the patient was immediately drove into the ER by a friend.  Patient reports she woke up feeling well this morning without any new complaints.  She denies ever having felt this way before.     Patient received a laparoscopic cholecystectomy 5 days ago 06/20/2024 but otherwise has no recent medical changes.     ED course:  VSS on arrival.  GCS 15, cardiopulmonary exam benign.  Troponin and BNP WNL.  EKG normal sinus rhythm.  CTA chest showed no acute findings.  CXR shows no acute abnormality.  ED treated the patient with nitroglycerin, aspirin 325.  Patient also received GI cocktail because of recent cholecystectomy in concern for possible GERD but this did not have any effect.  Cis consulted for the morning to rule out any cardiac origin due to very classic cardiovascular presentation.    Cardiology team evaluated the patient, patient's troponin negative x3 with EKG normal sinus rhythm normal BNP echo  without abnormalities cardiology team suspected chest pain likely due to biliary issue signed off recommended follow up with primary cardiologist within 1-2 weeks of discharge.    General surgery team was consulted.        Interval Hx:   No acute events reported overnight.    Seen at bedside, patient's family members at bedside patient reported right upper quadrant pain denied nausea patient  is currently NPO   Patient had MRI/mrcp this evening, intra and extrahepatic biliary ductal dilatation with tapering at the pancreatic head but no obstructing lesion seen  Results discussed with patient and family members    Objective/physical exam:  General: In no acute distress, afebrile  Chest:  Unlabored breathing   Heart:  Normal rate  Abdomen: Soft, tender to palpation right upper quadrant   MSK: Warm, dry  Neurologic: Alert and oriented x4 grossly nonfocal  VITAL SIGNS: 24 HRS MIN & MAX LAST   Temp  Min: 97.8 °F (36.6 °C)  Max: 98.4 °F (36.9 °C) 98 °F (36.7 °C)   BP  Min: 111/70  Max: 136/81 114/70   Pulse  Min: 65  Max: 74  65   Resp  Min: 16  Max: 16 16   SpO2  Min: 97 %  Max: 99 % 98 %     I have reviewed the following labs:  Recent Labs   Lab 06/25/24  1624 06/26/24  0407 06/27/24  0428   WBC 6.58 3.90* 3.65*   RBC 4.96 4.85 5.12   HGB 12.7 12.4 13.1   HCT 39.1 38.5 40.6   MCV 78.8* 79.4* 79.3*   MCH 25.6* 25.6* 25.6*   MCHC 32.5* 32.2* 32.3*   RDW 16.6 16.7 17.0    220 212   MPV 9.9 10.2 10.1     Recent Labs   Lab 06/26/24  0407 06/26/24  1034 06/27/24  0428    143 143   K 3.9 3.9 3.8   * 108* 106   CO2 25 25 27   BUN 8.3* 6.3* 8.4*   CREATININE 0.67 0.65 0.64   CALCIUM 9.5 9.2 9.8   MG  --   --  1.70   ALBUMIN 3.7 3.8 3.7   ALKPHOS 206* 229* 273*   * 512* 814*   * 1,210* 1,224*   BILITOT 2.2* 2.6* 6.1*     Microbiology Results (last 7 days)       ** No results found for the last 168 hours. **             See below for Radiology    Scheduled Med:   aspirin  81 mg Oral Daily    cetirizine  10 mg Oral Daily    cyanocobalamin  1,000 mcg Oral Daily    docusate sodium  50 mg Oral Daily    enoxparin  40 mg Subcutaneous Daily    famotidine  20 mg Oral BID      Continuous Infusions:     PRN Meds:    Current Facility-Administered Medications:     acetaminophen, 650 mg, Oral, Q4H PRN    aluminum-magnesium hydroxide-simethicone, 30 mL, Oral, QID PRN    dextrose 10%, 12.5 g,  Intravenous, PRN    dextrose 10%, 25 g, Intravenous, PRN    glucagon (human recombinant), 1 mg, Intramuscular, PRN    glucose, 16 g, Oral, PRN    glucose, 24 g, Oral, PRN    melatonin, 6 mg, Oral, Nightly PRN    naloxone, 0.02 mg, Intravenous, PRN    ondansetron, 4 mg, Intravenous, Q6H PRN    oxyCODONE, 5 mg, Oral, Q6H PRN    oxyCODONE, 10 mg, Oral, Q6H PRN    polyethylene glycol, 17 g, Oral, BID PRN    prochlorperazine, 5 mg, Intravenous, Q6H PRN    senna-docusate 8.6-50 mg, 1 tablet, Oral, BID PRN    sodium chloride 0.9%, 10 mL, Intravenous, Q12H PRN     Assessment/Plan:  Transaminitis, hyperbilirubinemia -r/o obstructive pathology status post recent laparoscopic cholecystectomy 06/20/2024   Chest pain ruled out ACS     HX: Nonobstructive CAD, VHD, PFO, Solitary left kidney, fibromyalgia, generalized anxiety, osteoporosis     Labs from this morning showed AST 1224, , bilirubin 6.1, worsened liver enzymes, bilirubin   Patient had HIDA scan, negative for bile leak but concern for high-grade common bile duct obstruction, MRCP reported ductal dilatation with a tapering at the pancreatic head no obstructing lesion seen   Gastroenterology  consulted.  Follow recommendations   General surgery team on board, follow recommendations   Cardiology signed off, notes reviewed.  Keep NPO after midnight  Case discussed with patient's nurse  Follow labs    VTE prophylaxis:  Lovenox    Patient condition:  Fair    Anticipated discharge and Disposition:   TBD        _____________________________________________________________________    Nutrition Status:    Radiology:  I have personally reviewed the following imaging and agree with the radiologist.     MRI Abdomen W WO Contrast_INC MRCP (XPD)  Narrative: EXAMINATION:  MRI ABDOMEN WITH AND WO_INC MRCP (XPD)    CLINICAL HISTORY:  Right upper quadrant painbiliary occlusion;    TECHNIQUE:  Multiplanar multisequence MRI of the abdomen performed without and with gadolinium based IV  contrast.  MRCP was performed as well    COMPARISON:  CT 26 June 2024    FINDINGS:  There is no significant abnormality of the liver.  The gallbladder is surgically absent.  Mild stranding along the gallbladder fossa.  There is intra and extrahepatic biliary ductal dilatation with the common bile duct measuring up to 14 mm.  There is tapering at the level of the pancreatic head image 79 series 9 but no obstructing lesion is seen.    There is no significant abnormality of the spleen.  No peripancreatic inflammation.  The main pancreatic duct is not dilated.  Suspect pancreas divisum.  Normal adrenals..  There is no hydronephrosis or suspicious renal lesion.    No bowel obstruction.  No ascites.  Abdominal aorta normal in caliber.  Impression: 1. Intra and extrahepatic biliary ductal dilatation with tapering at the pancreatic head but no obstructing lesion seen.  2. Status post cholecystectomy with mild stranding along the gallbladder fossa.    Electronically signed by: Devin Burgos  Date:    06/27/2024  Time:    16:10  NM Hepatobiliary Scan (HIDA)  Narrative: EXAMINATION:  NM [facility]PATOBILI[facility]  (HIDA)    CLINICAL HISTORY:  hyperbilirubinemia s/p lap catrachito;Right upper quadrant pain    TECHNIQUE:  8.8 mCi of intravenous Choletec was administered followed by focused gamma camera imaging of the abdomen.    COMPARISON:  CT abdomen and pelvis June 20, 2024    FINDINGS:  There is prompt hepatocellular uptake.  Patient is status post cholecystectomy.  There is no extrahepatic or extra ductal radioisotope labeled bile accumulation to indicate a bile leak.    There was no biliary to bowel clearance of radioisotope during initial 60 minutes of scintigraphy.  There was also no transit of labeled bile into the small bowel on the delayed images performed at 90 minutes, 2 hour and 4 hour.  Impression: 1.  Negative for bile leak.    2.  Lack of biliary to bowel radioisotope labeled bile clearance is consistent with high  grade common bile duct obstruction.  This may be secondary to choledocholithiasis.    Electronically signed by: Jonathan Campuzano  Date:    06/27/2024  Time:    12:13      Genet Joseph MD  Department of Hospital Medicine   [facility]   06/27/2024

## 2024-06-28 ENCOUNTER — ANESTHESIA EVENT (OUTPATIENT)
Dept: ENDOSCOPY | Facility: HOSPITAL | Age: 59
End: 2024-06-28
Payer: MEDICARE

## 2024-06-28 ENCOUNTER — ANESTHESIA (OUTPATIENT)
Dept: ENDOSCOPY | Facility: HOSPITAL | Age: 59
End: 2024-06-28
Payer: MEDICARE

## 2024-06-28 LAB
ALBUMIN SERPL-MCNC: 3.9 G/DL (ref 3.5–5)
ALBUMIN/GLOB SERPL: 1.1 RATIO (ref 1.1–2)
ALP SERPL-CCNC: 287 UNIT/L (ref 40–150)
ALT SERPL-CCNC: 651 UNIT/L (ref 0–55)
ANION GAP SERPL CALC-SCNC: 12 MEQ/L
AST SERPL-CCNC: 455 UNIT/L (ref 5–34)
BASOPHILS # BLD AUTO: 0.02 X10(3)/MCL
BASOPHILS NFR BLD AUTO: 0.4 %
BILIRUB SERPL-MCNC: 2.6 MG/DL
BUN SERPL-MCNC: 11.7 MG/DL (ref 9.8–20.1)
CALCIUM SERPL-MCNC: 9.6 MG/DL (ref 8.4–10.2)
CHLORIDE SERPL-SCNC: 105 MMOL/L (ref 98–107)
CO2 SERPL-SCNC: 22 MMOL/L (ref 22–29)
CREAT SERPL-MCNC: 0.73 MG/DL (ref 0.55–1.02)
CREAT/UREA NIT SERPL: 16
EOSINOPHIL # BLD AUTO: 0.14 X10(3)/MCL (ref 0–0.9)
EOSINOPHIL NFR BLD AUTO: 2.5 %
ERYTHROCYTE [DISTWIDTH] IN BLOOD BY AUTOMATED COUNT: 17.2 % (ref 11.5–17)
GFR SERPLBLD CREATININE-BSD FMLA CKD-EPI: >60 ML/MIN/1.73/M2
GLOBULIN SER-MCNC: 3.5 GM/DL (ref 2.4–3.5)
GLUCOSE SERPL-MCNC: 110 MG/DL (ref 74–100)
HCT VFR BLD AUTO: 40.7 % (ref 37–47)
HGB BLD-MCNC: 13.4 G/DL (ref 12–16)
IMM GRANULOCYTES # BLD AUTO: 0.01 X10(3)/MCL (ref 0–0.04)
IMM GRANULOCYTES NFR BLD AUTO: 0.2 %
LYMPHOCYTES # BLD AUTO: 1.48 X10(3)/MCL (ref 0.6–4.6)
LYMPHOCYTES NFR BLD AUTO: 26.2 %
MCH RBC QN AUTO: 26.1 PG (ref 27–31)
MCHC RBC AUTO-ENTMCNC: 32.9 G/DL (ref 33–36)
MCV RBC AUTO: 79.3 FL (ref 80–94)
MONOCYTES # BLD AUTO: 0.59 X10(3)/MCL (ref 0.1–1.3)
MONOCYTES NFR BLD AUTO: 10.5 %
NEUTROPHILS # BLD AUTO: 3.4 X10(3)/MCL (ref 2.1–9.2)
NEUTROPHILS NFR BLD AUTO: 60.2 %
NRBC BLD AUTO-RTO: 0 %
PLATELET # BLD AUTO: 250 X10(3)/MCL (ref 130–400)
PMV BLD AUTO: 10.7 FL (ref 7.4–10.4)
POTASSIUM SERPL-SCNC: 3.6 MMOL/L (ref 3.5–5.1)
PROT SERPL-MCNC: 7.4 GM/DL (ref 6.4–8.3)
RBC # BLD AUTO: 5.13 X10(6)/MCL (ref 4.2–5.4)
SODIUM SERPL-SCNC: 139 MMOL/L (ref 136–145)
WBC # BLD AUTO: 5.64 X10(3)/MCL (ref 4.5–11.5)

## 2024-06-28 PROCEDURE — 25000003 PHARM REV CODE 250

## 2024-06-28 PROCEDURE — 43264 ERCP REMOVE DUCT CALCULI: CPT | Performed by: INTERNAL MEDICINE

## 2024-06-28 PROCEDURE — 25000003 PHARM REV CODE 250: Performed by: INTERNAL MEDICINE

## 2024-06-28 PROCEDURE — 25500020 PHARM REV CODE 255: Performed by: INTERNAL MEDICINE

## 2024-06-28 PROCEDURE — 36415 COLL VENOUS BLD VENIPUNCTURE: CPT

## 2024-06-28 PROCEDURE — 25000003 PHARM REV CODE 250: Performed by: ANESTHESIOLOGY

## 2024-06-28 PROCEDURE — 85025 COMPLETE CBC W/AUTO DIFF WBC: CPT

## 2024-06-28 PROCEDURE — 21400001 HC TELEMETRY ROOM

## 2024-06-28 PROCEDURE — 63600175 PHARM REV CODE 636 W HCPCS: Performed by: STUDENT IN AN ORGANIZED HEALTH CARE EDUCATION/TRAINING PROGRAM

## 2024-06-28 PROCEDURE — C1769 GUIDE WIRE: HCPCS | Performed by: INTERNAL MEDICINE

## 2024-06-28 PROCEDURE — 37000009 HC ANESTHESIA EA ADD 15 MINS: Performed by: INTERNAL MEDICINE

## 2024-06-28 PROCEDURE — 25000003 PHARM REV CODE 250: Performed by: STUDENT IN AN ORGANIZED HEALTH CARE EDUCATION/TRAINING PROGRAM

## 2024-06-28 PROCEDURE — 63600175 PHARM REV CODE 636 W HCPCS

## 2024-06-28 PROCEDURE — 37000008 HC ANESTHESIA 1ST 15 MINUTES: Performed by: INTERNAL MEDICINE

## 2024-06-28 PROCEDURE — 80053 COMPREHEN METABOLIC PANEL: CPT

## 2024-06-28 PROCEDURE — 43262 ENDO CHOLANGIOPANCREATOGRAPH: CPT | Performed by: INTERNAL MEDICINE

## 2024-06-28 PROCEDURE — 0FC98ZZ EXTIRPATION OF MATTER FROM COMMON BILE DUCT, VIA NATURAL OR ARTIFICIAL OPENING ENDOSCOPIC: ICD-10-PCS | Performed by: INTERNAL MEDICINE

## 2024-06-28 PROCEDURE — 27201423 OPTIME MED/SURG SUP & DEVICES STERILE SUPPLY: Performed by: INTERNAL MEDICINE

## 2024-06-28 RX ORDER — AMOXICILLIN 250 MG
1 CAPSULE ORAL 2 TIMES DAILY
Status: DISCONTINUED | OUTPATIENT
Start: 2024-06-28 | End: 2024-06-29 | Stop reason: HOSPADM

## 2024-06-28 RX ORDER — MIDAZOLAM HYDROCHLORIDE 1 MG/ML
INJECTION INTRAMUSCULAR; INTRAVENOUS
Status: DISCONTINUED | OUTPATIENT
Start: 2024-06-28 | End: 2024-06-28

## 2024-06-28 RX ORDER — HYDROMORPHONE HYDROCHLORIDE 2 MG/ML
0.4 INJECTION, SOLUTION INTRAMUSCULAR; INTRAVENOUS; SUBCUTANEOUS EVERY 5 MIN PRN
OUTPATIENT
Start: 2024-06-28

## 2024-06-28 RX ORDER — ONDANSETRON 4 MG/1
8 TABLET, ORALLY DISINTEGRATING ORAL EVERY 6 HOURS PRN
Status: DISCONTINUED | OUTPATIENT
Start: 2024-06-28 | End: 2024-06-29 | Stop reason: HOSPADM

## 2024-06-28 RX ORDER — MEPERIDINE HYDROCHLORIDE 25 MG/ML
12.5 INJECTION INTRAMUSCULAR; INTRAVENOUS; SUBCUTANEOUS EVERY 10 MIN PRN
OUTPATIENT
Start: 2024-06-28 | End: 2024-06-29

## 2024-06-28 RX ORDER — IPRATROPIUM BROMIDE AND ALBUTEROL SULFATE 2.5; .5 MG/3ML; MG/3ML
3 SOLUTION RESPIRATORY (INHALATION)
OUTPATIENT
Start: 2024-06-28

## 2024-06-28 RX ORDER — ROCURONIUM BROMIDE 10 MG/ML
INJECTION, SOLUTION INTRAVENOUS
Status: DISCONTINUED | OUTPATIENT
Start: 2024-06-28 | End: 2024-06-28

## 2024-06-28 RX ORDER — SCOLOPAMINE TRANSDERMAL SYSTEM 1 MG/1
PATCH, EXTENDED RELEASE TRANSDERMAL
Status: DISPENSED
Start: 2024-06-28 | End: 2024-06-29

## 2024-06-28 RX ORDER — INDOMETHACIN 50 MG/1
SUPPOSITORY RECTAL
Status: DISPENSED
Start: 2024-06-28 | End: 2024-06-29

## 2024-06-28 RX ORDER — SODIUM CHLORIDE, SODIUM GLUCONATE, SODIUM ACETATE, POTASSIUM CHLORIDE AND MAGNESIUM CHLORIDE 30; 37; 368; 526; 502 MG/100ML; MG/100ML; MG/100ML; MG/100ML; MG/100ML
INJECTION, SOLUTION INTRAVENOUS CONTINUOUS
Status: DISCONTINUED | OUTPATIENT
Start: 2024-06-28 | End: 2024-06-29 | Stop reason: HOSPADM

## 2024-06-28 RX ORDER — PROCHLORPERAZINE EDISYLATE 5 MG/ML
5 INJECTION INTRAMUSCULAR; INTRAVENOUS EVERY 30 MIN PRN
OUTPATIENT
Start: 2024-06-28

## 2024-06-28 RX ORDER — ONDANSETRON HYDROCHLORIDE 2 MG/ML
4 INJECTION, SOLUTION INTRAVENOUS DAILY PRN
OUTPATIENT
Start: 2024-06-28

## 2024-06-28 RX ORDER — POLYETHYLENE GLYCOL 3350 17 G/17G
17 POWDER, FOR SOLUTION ORAL DAILY
Status: DISCONTINUED | OUTPATIENT
Start: 2024-06-28 | End: 2024-06-29 | Stop reason: HOSPADM

## 2024-06-28 RX ORDER — HYDROMORPHONE HYDROCHLORIDE 2 MG/ML
0.2 INJECTION, SOLUTION INTRAMUSCULAR; INTRAVENOUS; SUBCUTANEOUS EVERY 5 MIN PRN
OUTPATIENT
Start: 2024-06-28

## 2024-06-28 RX ORDER — ONDANSETRON HYDROCHLORIDE 2 MG/ML
INJECTION, SOLUTION INTRAVENOUS
Status: DISCONTINUED | OUTPATIENT
Start: 2024-06-28 | End: 2024-06-28

## 2024-06-28 RX ORDER — LIDOCAINE HYDROCHLORIDE 20 MG/ML
INJECTION, SOLUTION EPIDURAL; INFILTRATION; INTRACAUDAL; PERINEURAL
Status: DISCONTINUED | OUTPATIENT
Start: 2024-06-28 | End: 2024-06-28

## 2024-06-28 RX ORDER — FENTANYL CITRATE 50 UG/ML
INJECTION, SOLUTION INTRAMUSCULAR; INTRAVENOUS
Status: DISCONTINUED | OUTPATIENT
Start: 2024-06-28 | End: 2024-06-28

## 2024-06-28 RX ORDER — SCOLOPAMINE TRANSDERMAL SYSTEM 1 MG/1
1 PATCH, EXTENDED RELEASE TRANSDERMAL ONCE
Status: DISCONTINUED | OUTPATIENT
Start: 2024-06-28 | End: 2024-06-29 | Stop reason: HOSPADM

## 2024-06-28 RX ORDER — PROPOFOL 10 MG/ML
VIAL (ML) INTRAVENOUS
Status: DISCONTINUED | OUTPATIENT
Start: 2024-06-28 | End: 2024-06-28

## 2024-06-28 RX ORDER — INDOMETHACIN 50 MG/1
100 SUPPOSITORY RECTAL SEE ADMIN INSTRUCTIONS
Status: DISCONTINUED | OUTPATIENT
Start: 2024-06-28 | End: 2024-06-29 | Stop reason: HOSPADM

## 2024-06-28 RX ORDER — LIDOCAINE HYDROCHLORIDE 10 MG/ML
1 INJECTION, SOLUTION EPIDURAL; INFILTRATION; INTRACAUDAL; PERINEURAL ONCE
Status: DISCONTINUED | OUTPATIENT
Start: 2024-06-28 | End: 2024-06-29 | Stop reason: HOSPADM

## 2024-06-28 RX ADMIN — POLYETHYLENE GLYCOL 3350 17 G: 17 POWDER, FOR SOLUTION ORAL at 04:06

## 2024-06-28 RX ADMIN — MIDAZOLAM HYDROCHLORIDE 1 MG: 1 INJECTION, SOLUTION INTRAMUSCULAR; INTRAVENOUS at 01:06

## 2024-06-28 RX ADMIN — INDOMETHACIN 100 MG: 50 SUPPOSITORY RECTAL at 01:06

## 2024-06-28 RX ADMIN — SUGAMMADEX 200 MG: 100 INJECTION, SOLUTION INTRAVENOUS at 02:06

## 2024-06-28 RX ADMIN — LIDOCAINE HYDROCHLORIDE 80 MG: 20 INJECTION, SOLUTION INTRAVENOUS at 01:06

## 2024-06-28 RX ADMIN — SCOPOLAMINE 1 PATCH: 1 PATCH TRANSDERMAL at 12:06

## 2024-06-28 RX ADMIN — CYANOCOBALAMIN TAB 500 MCG 1000 MCG: 500 TAB at 04:06

## 2024-06-28 RX ADMIN — FENTANYL CITRATE 50 MCG: 50 INJECTION, SOLUTION INTRAMUSCULAR; INTRAVENOUS at 01:06

## 2024-06-28 RX ADMIN — SODIUM CHLORIDE, SODIUM GLUCONATE, SODIUM ACETATE, POTASSIUM CHLORIDE AND MAGNESIUM CHLORIDE: 526; 502; 368; 37; 30 INJECTION, SOLUTION INTRAVENOUS at 01:06

## 2024-06-28 RX ADMIN — FAMOTIDINE 20 MG: 20 TABLET, FILM COATED ORAL at 08:06

## 2024-06-28 RX ADMIN — SENNOSIDES AND DOCUSATE SODIUM 1 TABLET: 50; 8.6 TABLET ORAL at 08:06

## 2024-06-28 RX ADMIN — SENNOSIDES AND DOCUSATE SODIUM 1 TABLET: 50; 8.6 TABLET ORAL at 04:06

## 2024-06-28 RX ADMIN — DOCUSATE SODIUM 50 MG: 50 CAPSULE, LIQUID FILLED ORAL at 04:06

## 2024-06-28 RX ADMIN — SODIUM CHLORIDE, SODIUM GLUCONATE, SODIUM ACETATE, POTASSIUM CHLORIDE AND MAGNESIUM CHLORIDE: 526; 502; 368; 37; 30 INJECTION, SOLUTION INTRAVENOUS at 12:06

## 2024-06-28 RX ADMIN — FAMOTIDINE 20 MG: 20 TABLET, FILM COATED ORAL at 04:06

## 2024-06-28 RX ADMIN — ONDANSETRON 4 MG: 2 INJECTION INTRAMUSCULAR; INTRAVENOUS at 02:06

## 2024-06-28 RX ADMIN — CETIRIZINE HYDROCHLORIDE 10 MG: 10 TABLET, FILM COATED ORAL at 04:06

## 2024-06-28 RX ADMIN — ROCURONIUM BROMIDE 40 MG: 10 SOLUTION INTRAVENOUS at 01:06

## 2024-06-28 RX ADMIN — PROPOFOL 150 MG: 10 INJECTION, EMULSION INTRAVENOUS at 01:06

## 2024-06-28 NOTE — NURSING
Report from GI lab:   Clear liquids x 4 hours then advance as tolerated.  Hold lovenox tonight - may resume tomorrow if no bleeding.  Per Dr. Copeland.

## 2024-06-28 NOTE — PROGRESS NOTES
Patient seen and examined patient with dilated biliary system common elevated liver enzymes  She still has persistent pain with elevated liver enzymes and abnormal imaging  Questionable stone in the middle of the duct explained the ERCP procedure to his risk of pancreatitis bleeding she is in agreement with proceeding with the exam mild epigastric tenderness

## 2024-06-28 NOTE — TRANSFER OF CARE
"Anesthesia Transfer of Care Note    Patient: Renetta Cornejo    Procedure(s) Performed: Procedure(s) (LRB):  ERCP (N/A)  ERCP, WITH SPHINCTEROTOMY    Patient location: GI    Anesthesia Type: general    Transport from OR: Transported from OR on room air with adequate spontaneous ventilation    Post pain: adequate analgesia    Post assessment: no apparent anesthetic complications    Post vital signs: stable    Level of consciousness: sedated    Nausea/Vomiting: no nausea/vomiting    Complications: none    Transfer of care protocol was followed      Last vitals: Visit Vitals  /74 (BP Location: Left arm, Patient Position: Lying)   Pulse 70   Temp 36.7 °C (98.1 °F) (Skin)   Resp 12   Ht 5' 5" (1.651 m)   Wt 85.7 kg (189 lb)   SpO2 100%   BMI 31.45 kg/m²     "

## 2024-06-28 NOTE — PROGRESS NOTES
Acute Care Surgery   Progress Note  Admit Date: 6/25/2024  HD#3  POD#* No surgery found *    Subjective:   Interval history:  AF HDS   Does continue with abdominal pain, but much improved   Also reports improvement in nausea, denies any emesis   + Flatus, no BM     Home Meds:  Current Outpatient Medications   Medication Instructions    acetaminophen (TYLENOL) 325 mg, Oral, Every 6 hours PRN    ALPRAZolam (XANAX) 1 mg, Nightly    cetirizine (ZYRTEC) 10 mg, Oral, Daily    cyanocobalamin (VITAMIN B-12) 1,000 mcg, Oral    diclofenac sodium (VOLTAREN) 1 % Gel No dose, route, or frequency recorded.    famotidine (PEPCID) 40 mg, Oral    famotidine (PEPCID) 20 mg, 2 times daily PRN    fluticasone propionate (FLONASE) 50 mcg/actuation nasal spray INSTILL ONE SPRAY INTO EACH NOSTRIL ONCE A DAY    HYDROcodone-acetaminophen (NORCO)  mg per tablet No dose, route, or frequency recorded.    LIDOcaine (LIDODERM) 5 % 1 patch, Transdermal, Daily, Remove & Discard patch within 12 hours or as directed by MD    losartan-hydrochlorothiazide 50-12.5 mg (HYZAAR) 50-12.5 mg per tablet No dose, route, or frequency recorded.    omeprazole (PRILOSEC) 20 mg    RESTASIS 0.05 % ophthalmic emulsion 1 drop, Both Eyes, Daily      Scheduled Meds:   aspirin  81 mg Oral Daily    cetirizine  10 mg Oral Daily    cyanocobalamin  1,000 mcg Oral Daily    docusate sodium  50 mg Oral Daily    enoxparin  40 mg Subcutaneous Daily    famotidine  20 mg Oral BID    polyethylene glycol  17 g Oral Daily    senna-docusate 8.6-50 mg  1 tablet Oral BID     Continuous Infusions:  PRN Meds:  Current Facility-Administered Medications:     acetaminophen, 650 mg, Oral, Q4H PRN    aluminum-magnesium hydroxide-simethicone, 30 mL, Oral, QID PRN    dextrose 10%, 12.5 g, Intravenous, PRN    dextrose 10%, 25 g, Intravenous, PRN    glucagon (human recombinant), 1 mg, Intramuscular, PRN    glucose, 16 g, Oral, PRN    glucose, 24 g, Oral, PRN    melatonin, 6 mg, Oral,  "Nightly PRN    naloxone, 0.02 mg, Intravenous, PRN    ondansetron, 4 mg, Intravenous, Q6H PRN    oxyCODONE, 5 mg, Oral, Q6H PRN    oxyCODONE, 10 mg, Oral, Q6H PRN    prochlorperazine, 5 mg, Intravenous, Q6H PRN    sodium chloride 0.9%, 10 mL, Intravenous, Q12H PRN     Objective:     VITAL SIGNS: 24 HR MIN & MAX LAST   Temp  Min: 97.9 °F (36.6 °C)  Max: 98.7 °F (37.1 °C)  98.7 °F (37.1 °C)   BP  Min: 114/70  Max: 147/68  126/80    Pulse  Min: 65  Max: 80  80    Resp  Min: 16  Max: 18  18    SpO2  Min: 96 %  Max: 100 %  96 %      HT: 5' 5" (165.1 cm)  WT: 85.7 kg (189 lb)  BMI: 31.5     Intake/output:  Intake/Output - Last 3 Shifts         06/26 0700  06/27 0659 06/27 0700 06/28 0659 06/28 0700 06/29 0659    P.O. 200      Total Intake(mL/kg) 200 (2.3)      Net +200             Urine Occurrence 3 x 3 x           No intake or output data in the 24 hours ending 06/28/24 0725          Lines/drains/airway:       Peripheral IV - Single Lumen 06/25/24 1637 20 G Right Antecubital (Active)   Site Assessment Clean;Dry;Intact 06/27/24 1110   Extremity Assessment Distal to IV No abnormal discoloration 06/27/24 0746   Line Status Saline locked 06/27/24 1110   Dressing Status Clean;Dry;Intact 06/27/24 1110   Dressing Intervention Integrity maintained 06/27/24 0746   Number of days: 1       Physical examination:  General: NAD  HEENT: Anicteric sclera  Resp: Unlabored respirations  Cardiac: RRR  Abdomen: Soft, non distended, incisions clean, dry, intact, mildly tender to RUQ and epigastric region   Extremities: Well perfused    Labs:  Renal:  Recent Labs     06/26/24  0407 06/26/24  1034 06/27/24  0428 06/28/24  0412   BUN 8.3* 6.3* 8.4* 11.7   CREATININE 0.67 0.65 0.64 0.73     No results for input(s): "LACTIC" in the last 72 hours.  FENGI:  Recent Labs     06/26/24  0407 06/26/24  1034 06/27/24  0428 06/28/24  0412    143 143 139   K 3.9 3.9 3.8 3.6   * 108* 106 105   CO2 25 25 27 22   CALCIUM 9.5 9.2 9.8 9.6   MG  -- "   --  1.70  --    ALBUMIN 3.7 3.8 3.7 3.9   BILITOT 2.2* 2.6* 6.1* 2.6*   * 1,210* 1,224* 455*   ALKPHOS 206* 229* 273* 287*   * 512* 814* 651*     Heme:  Recent Labs     06/25/24  1624 06/26/24  0407 06/27/24  0428 06/28/24  0412   HGB 12.7 12.4 13.1 13.4   HCT 39.1 38.5 40.6 40.7    220 212 250     ID:  Recent Labs     06/25/24  1624 06/26/24  0407 06/27/24  0428 06/28/24  0412   WBC 6.58 3.90* 3.65* 5.64     CBG:  Recent Labs     06/26/24  0407 06/26/24  1034 06/27/24  0428 06/28/24  0412   GLUCOSE 94 91 91 110*      Cardiovascular:  Recent Labs   Lab 06/25/24  1624 06/25/24  2307 06/26/24  0407 06/26/24  1034 06/27/24  0428   TROPONINI <0.010   < > <0.010 <0.010 <0.010   BNP 12.4  --   --   --   --     < > = values in this interval not displayed.     I have reviewed all pertinent lab results within the past 24 hours.    Imaging:  MRI Abdomen W WO Contrast_INC MRCP (XPD)   Final Result      1. Intra and extrahepatic biliary ductal dilatation with tapering at the pancreatic head but no obstructing lesion seen.   2. Status post cholecystectomy with mild stranding along the gallbladder fossa.         Electronically signed by: Devin Burgos   Date:    06/27/2024   Time:    16:10      NM Hepatobiliary Scan (HIDA)   Final Result      1.  Negative for bile leak.      2.  Lack of biliary to bowel radioisotope labeled bile clearance is consistent with high grade common bile duct obstruction.  This may be secondary to choledocholithiasis.         Electronically signed by: Jonathan Campuzano   Date:    06/27/2024   Time:    12:13      CT Abdomen Pelvis With IV Contrast NO Oral Contrast   Final Result      1. Stranding along the right hepatic lobe and pericolic gutter likely postsurgical given recent cholecystectomy.   2. Biliary ductal dilatation may be secondary to cholecystectomy but recommend correlation with cholestatic parameters.   3. Question gastritis.         Electronically signed by: Devin Burgos    Date:    06/26/2024   Time:    08:58      CTA Chest Non-Coronary (PE Studies)   Final Result      No pulmonary embolism identified.         Electronically signed by: Henrique Nolasco   Date:    06/25/2024   Time:    19:15      X-Ray Chest 1 View   Final Result      No acute findings.         Electronically signed by: Devin Burgos   Date:    06/25/2024   Time:    16:54         I have reviewed all pertinent imaging results/findings within the past 24 hours.    Micro/Path/Other:  Microbiology Results (last 7 days)       ** No results found for the last 168 hours. **           Pathology Results  (Last 7 days)      None             Assessment & Plan:   58 y.o. female  with past medical history of HTN and s/p lap catrachito 6/20 at [facility] who presented yesterday with chest pain. Cardiac work up negative. Patient pain now mostly RUQ and radiates to the back. General surgery consulted for evaluation.     - HIDA with evidence of high grade biliary obstruction.  - MRCP with evidence of intra and extra hepatic biliary ductal dilation with tapering at pancreatic head but no obstructing lesion   - GI consulted, appreciate input  - PRN pain and nausea   - Scheduled bowel regimen  - Will continue to follow     Ramila Means MD  [employer] General Surgery, PGY-1

## 2024-06-28 NOTE — PROVATION PATIENT INSTRUCTIONS
Discharge Summary/Instructions after an Endoscopic Procedure  Patient Name: Renetta Cornejo  Patient MRN: [id number]  Patient YOB: 1965  Friday, June 28, 2024  Maxx Copeland MD  Dear patient,  As a result of recent federal legislation (The Federal Cures Act), you may   receive lab or pathology results from your procedure in your [facility]   account before your physician is able to contact you. Your physician or   their representative will relay the results to you with their   recommendations at their soonest availability.  Thank you,  RESTRICTIONS:  During your procedure today, you received medications for sedation.  These   medications may affect your judgment, balance and coordination.  Therefore,   for 24 hours, you have the following restrictions:   - DO NOT drive a car, operate machinery, make legal/financial decisions,   sign important papers or drink alcohol.    ACTIVITY:  Today: no heavy lifting, straining or running due to procedural   sedation/anesthesia.  The following day: return to full activity including work.  DIET:  Eat and drink normally unless instructed otherwise.     TREATMENT FOR COMMON SIDE EFFECTS:  - Mild abdominal pain, nausea, belching, bloating or excessive gas:  rest,   eat lightly and use a heating pad.  - Sore Throat: treat with throat lozenges and/or gargle with warm salt   water.  - Because air was used during the procedure, expelling large amounts of air   from your rectum or belching is normal.  - If a bowel prep was taken, you may not have a bowel movement for 1-3 days.    This is normal.  SYMPTOMS TO WATCH FOR AND REPORT TO YOUR PHYSICIAN:  1. Abdominal pain or bloating, other than gas cramps.  2. Chest pain.  3. Back pain.  4. Signs of infection such as: chills or fever occurring within 24 hours   after the procedure.  5. Rectal bleeding, which would show as bright red, maroon, or black stools.   (A tablespoon of blood from the rectum is not serious, especially if    hemorrhoids are present.)  6. Vomiting.  7. Weakness or dizziness.  GO DIRECTLY TO THE NEAREST EMERGENCY ROOM IF YOU HAVE ANY OF THE FOLLOWING:      Difficulty breathing              Chills and/or fever over 101 F   Persistent vomiting and/or vomiting blood   Severe abdominal pain   Severe chest pain   Black, tarry stools   Bleeding- more than one tablespoon   Any other symptom or condition that you feel may need urgent attention  Your doctor recommends these additional instructions:  If any biopsies were taken, your doctors clinic will contact you in 1 to 2   weeks with any results.  Return the patient to hospital [facility] monitor for any signs or symptoms of   pancreatitis  Okay for clear liquids.  If no pain in 4 hours okay to advance slowly to a   low-fat diet  Would hold Lovenox today if no bleeding blood counts are good okay to resume   tomorrow  - Return patient to hospital [facility] for ongoing care.  For questions, problems or results please call your physician - Maxx Copeland MD at Work:  [phone].  [facility], EMERGENCY ROOM PHONE NUMBER: [phone]  IF A COMPLICATION OR EMERGENCY SITUATION ARISES AND YOU ARE UNABLE TO REACH   YOUR PHYSICIAN - GO DIRECTLY TO THE EMERGENCY ROOM.  MD Maxx Castro MD  6/28/2024 2:31:40 PM  This report has been verified and signed electronically.  Dear patient,  As a result of recent federal legislation (The Federal Cures Act), you may   receive lab or pathology results from your procedure in your [facility]   account before your physician is able to contact you. Your physician or   their representative will relay the results to you with their   recommendations at their soonest availability.  Thank you,  PROVATION

## 2024-06-28 NOTE — PLAN OF CARE
Problem: Adult Inpatient Plan of Care  Goal: Plan of Care Review  Outcome: Progressing  Goal: Patient-Specific Goal (Individualized)  Outcome: Progressing  Goal: Absence of Hospital-Acquired Illness or Injury  Outcome: Progressing  Intervention: Prevent and Manage VTE (Venous Thromboembolism) Risk  Flowsheets (Taken 6/28/2024 075)  VTE Prevention/Management: bleeding precations maintained  Goal: Optimal Comfort and Wellbeing  Outcome: Progressing  Goal: Readiness for Transition of Care  Outcome: Progressing

## 2024-06-28 NOTE — PROGRESS NOTES
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  Chest pain described as smothering, 10/10, and associated with shortness of breath, sweating, anxiety.  Exacerbated by exertion.  Alleviated by rest and by nitroglycerin given in the ED.  No treatments tried at home and the patient was immediately drove into the ER by a friend.  Patient reports she woke up feeling well this morning without any new complaints.  She denies ever having felt this way before.     Patient received a laparoscopic cholecystectomy 5 days ago 06/20/2024 but otherwise has no recent medical changes.     ED course:  VSS on arrival.  GCS 15, cardiopulmonary exam benign.  Troponin and BNP WNL.  EKG normal sinus rhythm.  CTA chest showed no acute findings.  CXR shows no acute abnormality.  ED treated the patient with nitroglycerin, aspirin 325.  Patient also received GI cocktail because of recent cholecystectomy in concern for possible GERD but this did not have any effect.  Cis consulted for the morning to rule out any cardiac origin due to very classic cardiovascular presentation.    Cardiology team evaluated the patient, patient's troponin negative x3 with EKG normal sinus rhythm normal BNP echo  without abnormalities cardiology team suspected chest pain likely due to biliary issue signed off recommended follow up with primary cardiologist within 1-2 weeks of discharge.    General surgery team was consulted.        Interval Hx:   No acute events reported overnight.  Seen at bedside this morning, patient reported improved right upper quadrant pain, denied nausea vomiting.  GI NP  present during my interview case discussed patient is planned to go for ERCP later today.  Patient is currently NPO.        Objective/physical exam:  General: In no acute distress, afebrile  Chest:  Unlabored breathing   Heart:  Normal rate  Abdomen: Soft, nontender   MSK: Warm, dry  Neurologic: Alert and oriented x4 grossly nonfocal  VITAL SIGNS: 24 HRS MIN & MAX LAST   Temp  Min: 97.7 °F (36.5 °C)  Max: 98.9 °F (37.2 °C) 97.7 °F (36.5 °C)   BP  Min: 120/71  Max: 163/73 (!) 163/73   Pulse  Min: 58  Max: 108  (!) 58   Resp  Min: 11  Max: 21 16   SpO2  Min: 95 %  Max: 100 % 99 %     I have reviewed the following labs:  Recent Labs   Lab 06/26/24  0407 06/27/24  0428 06/28/24  0412   WBC 3.90* 3.65* 5.64   RBC 4.85 5.12 5.13   HGB 12.4 13.1 13.4   HCT 38.5 40.6 40.7   MCV 79.4* 79.3* 79.3*   MCH 25.6* 25.6* 26.1*   MCHC 32.2* 32.3* 32.9*   RDW 16.7 17.0 17.2*    212 250   MPV 10.2 10.1 10.7*     Recent Labs   Lab 06/26/24  1034 06/27/24  0428 06/28/24  0412    143 139   K 3.9 3.8 3.6   * 106 105   CO2 25 27 22   BUN 6.3* 8.4* 11.7   CREATININE 0.65 0.64 0.73   CALCIUM 9.2 9.8 9.6   MG  --  1.70  --    ALBUMIN 3.8 3.7 3.9   ALKPHOS 229* 273* 287*   * 814* 651*   AST 1,210* 1,224* 455*   BILITOT 2.6* 6.1* 2.6*     Microbiology Results (last 7 days)       ** No results found for the last 168 hours. **             See below for Radiology    Scheduled Med:   aspirin  81 mg Oral Daily    cetirizine  10 mg Oral Daily    cyanocobalamin  1,000 mcg Oral Daily    docusate sodium  50 mg Oral Daily    famotidine  20 mg Oral BID    indomethacin        LIDOcaine (PF) 10 mg/ml (1%)  1 mL Intradermal Once    polyethylene glycol  17 g Oral Daily    scopolamine        scopolamine  1 patch Transdermal Once    senna-docusate 8.6-50 mg  1 tablet Oral BID      Continuous Infusions:   electrolyte-A   Intravenous Continuous 10 mL/hr at 06/28/24 1247 New Bag at 06/28/24 1247      PRN Meds:    Current  Facility-Administered Medications:     acetaminophen, 650 mg, Oral, Q4H PRN    aluminum-magnesium hydroxide-simethicone, 30 mL, Oral, QID PRN    dextrose 10%, 12.5 g, Intravenous, PRN    dextrose 10%, 25 g, Intravenous, PRN    glucagon (human recombinant), 1 mg, Intramuscular, PRN    glucose, 16 g, Oral, PRN    glucose, 24 g, Oral, PRN    indomethacin, , ,     indomethacin, 100 mg, Rectal, See admin instructions    melatonin, 6 mg, Oral, Nightly PRN    naloxone, 0.02 mg, Intravenous, PRN    ondansetron, 8 mg, Oral, Q6H PRN    ondansetron, 4 mg, Intravenous, Q6H PRN    oxyCODONE, 5 mg, Oral, Q6H PRN    oxyCODONE, 10 mg, Oral, Q6H PRN    prochlorperazine, 5 mg, Intravenous, Q6H PRN    scopolamine, , ,     sodium chloride 0.9%, 10 mL, Intravenous, Q12H PRN     Assessment/Plan:  Transaminitis, hyperbilirubinemia -r/o obstructive pathology status post recent laparoscopic cholecystectomy 06/20/2024   Chest pain ruled out ACS     HX: Nonobstructive CAD, VHD, PFO, Solitary left kidney, fibromyalgia, generalized anxiety, osteoporosis     Labs from this morning showed improved liver enzymes , , , bilirubin total 2.6  Follow GI team recommendations, patient planned to have ERCP  today    Patient had HIDA scan, negative for bile leak but concern for high-grade common bile duct obstruction, MRCP reported ductal dilatation with a tapering at the pancreatic head no obstructing lesion seen    General surgery team on board, follow recommendations   Cardiology signed off, notes reviewed.  Case discussed with patient's nurse  Follow labs    VTE prophylaxis:  Lovenox    Patient condition:  Fair    Anticipated discharge and Disposition:   TBD        _____________________________________________________________________    Nutrition Status:    Radiology:  I have personally reviewed the following imaging and agree with the radiologist.     FL ERCP Biliary And Pancreatic By Rad Tech  Narrative: EXAMINATION:  FL ERCP  BILIARY AND PANCREATIC    CLINICAL HISTORY:  Choledocolithiasis;    FINDINGS:  Fluoroscopic assistance provided during ERCP.  Images were independently interpreted by the attending physician performing the procedure.    Fluoro time 4 minutes 20 seconds.    Reference Air Kerma: 50 mGy.  Impression: Fluoroscopic assistance provided as above.    Electronically signed by: Devin Burgos  Date:    06/28/2024  Time:    14:45      Genet Joseph MD  Department of Hospital Medicine   [facility]   06/28/2024

## 2024-06-28 NOTE — ANESTHESIA PROCEDURE NOTES
Intubation    Date/Time: 6/28/2024 1:45 PM    Performed by: Nhi Mcfadden CRNA  Authorized by: Linden Jensen Jr., MD    Intubation:     Induction:  Intravenous    Intubated:  Postinduction    Mask Ventilation:  Easy mask    Attempts:  1    Attempted By:  CRNA    Method of Intubation:  Direct    Blade:  Sorensen 2    Laryngeal View Grade: Grade I - full view of cords      Difficult Airway Encountered?: No      Complications:  None    Airway Device:  Oral endotracheal tube    Airway Device Size:  7.0    Style/Cuff Inflation:  Cuffed (inflated to minimal occlusive pressure)    Inflation Amount (mL):  5    Tube secured:  22    Secured at:  The lips    Placement Verified By:  Capnometry    Complicating Factors:  None    Findings Post-Intubation:  BS equal bilateral and atraumatic/condition of teeth unchanged

## 2024-06-28 NOTE — ANESTHESIA PREPROCEDURE EVALUATION
06/28/2024  Renetta Cornejo is a 58 y.o., female with unilateral kidney  and small patent foramen ovale who is 8 days postop from lap catrachito admitted June 25th with chest pain which resolved with rest and nitroglycerin.  Upon arrival EKG in troponins were normal and further workup revealed choledocholithiasis.  Patient presents today for ERCP.    Transthoracic echo June 26, 2024   EF 55% with normal diastolic function   Trace MR/TR   PA systolic pressure 17     Left/right heart catheterization (2019)  Mild coronary disease   No evidence of pulmonary hypertension    Pre-op Assessment    I have reviewed the Patient Summary Reports.    I have reviewed the NPO Status.   I have reviewed the Medications.     Review of Systems  Anesthesia Hx:               Denies Personal Hx of Anesthesia complications.                    Social:  Non-Smoker       Cardiovascular:     Hypertension                Functional Capacity good / => 4 METS                         Hepatic/GI:     GERD, well controlled                 Physical Exam  General: Well nourished, Cooperative, Alert and Oriented    Airway:  Mallampati: II   Mouth Opening: Normal  TM Distance: Normal  Tongue: Normal  Neck ROM: Normal ROM    Dental:  Intact, Caps / Implants    Chest/Lungs:  Clear to auscultation, Normal Respiratory Rate    Heart:  Rate: Normal  Rhythm: Regular Rhythm        Anesthesia Plan  Type of Anesthesia, risks & benefits discussed:    Anesthesia Type: Gen ETT  Intra-op Monitoring Plan: Standard ASA Monitors  Post Op Pain Control Plan: multimodal analgesia and IV/PO Opioids PRN  Induction:  IV  Airway Plan: Direct  Informed Consent: Informed consent signed with the Patient and all parties understand the risks and agree with anesthesia plan.  All questions answered.   ASA Score: 2  Day of Surgery Review of History & Physical: H&P Update referred  to the surgeon/provider.    Ready For Surgery From Anesthesia Perspective.     .

## 2024-06-29 VITALS
TEMPERATURE: 98 F | WEIGHT: 189 LBS | BODY MASS INDEX: 31.49 KG/M2 | HEART RATE: 80 BPM | SYSTOLIC BLOOD PRESSURE: 124 MMHG | OXYGEN SATURATION: 100 % | DIASTOLIC BLOOD PRESSURE: 68 MMHG | RESPIRATION RATE: 18 BRPM | HEIGHT: 65 IN

## 2024-06-29 PROBLEM — R07.9 CHEST PAIN: Status: ACTIVE | Noted: 2024-06-29

## 2024-06-29 PROBLEM — E80.6 HYPERBILIRUBINEMIA: Status: ACTIVE | Noted: 2024-06-29

## 2024-06-29 LAB
ALBUMIN SERPL-MCNC: 3.8 G/DL (ref 3.5–5)
ALBUMIN/GLOB SERPL: 1 RATIO (ref 1.1–2)
ALP SERPL-CCNC: 236 UNIT/L (ref 40–150)
ALT SERPL-CCNC: 420 UNIT/L (ref 0–55)
ANION GAP SERPL CALC-SCNC: 11 MEQ/L
AST SERPL-CCNC: 150 UNIT/L (ref 5–34)
BILIRUB SERPL-MCNC: 1.5 MG/DL
BUN SERPL-MCNC: 11.1 MG/DL (ref 9.8–20.1)
CALCIUM SERPL-MCNC: 10 MG/DL (ref 8.4–10.2)
CHLORIDE SERPL-SCNC: 104 MMOL/L (ref 98–107)
CO2 SERPL-SCNC: 25 MMOL/L (ref 22–29)
CREAT SERPL-MCNC: 0.71 MG/DL (ref 0.55–1.02)
CREAT/UREA NIT SERPL: 16
GFR SERPLBLD CREATININE-BSD FMLA CKD-EPI: >60 ML/MIN/1.73/M2
GLOBULIN SER-MCNC: 3.8 GM/DL (ref 2.4–3.5)
GLUCOSE SERPL-MCNC: 90 MG/DL (ref 74–100)
POTASSIUM SERPL-SCNC: 4.1 MMOL/L (ref 3.5–5.1)
PROT SERPL-MCNC: 7.6 GM/DL (ref 6.4–8.3)
SODIUM SERPL-SCNC: 140 MMOL/L (ref 136–145)

## 2024-06-29 PROCEDURE — 80053 COMPREHEN METABOLIC PANEL: CPT | Performed by: INTERNAL MEDICINE

## 2024-06-29 PROCEDURE — 99223 1ST HOSP IP/OBS HIGH 75: CPT | Mod: ,,, | Performed by: SURGERY

## 2024-06-29 PROCEDURE — 36415 COLL VENOUS BLD VENIPUNCTURE: CPT | Performed by: INTERNAL MEDICINE

## 2024-06-29 PROCEDURE — 25000003 PHARM REV CODE 250

## 2024-06-29 PROCEDURE — 25000003 PHARM REV CODE 250: Performed by: INTERNAL MEDICINE

## 2024-06-29 RX ORDER — ASPIRIN 81 MG/1
81 TABLET ORAL DAILY
Qty: 30 TABLET | Refills: 0 | Status: SHIPPED | OUTPATIENT
Start: 2024-06-30

## 2024-06-29 RX ADMIN — CYANOCOBALAMIN TAB 500 MCG 1000 MCG: 500 TAB at 09:06

## 2024-06-29 RX ADMIN — FAMOTIDINE 20 MG: 20 TABLET, FILM COATED ORAL at 09:06

## 2024-06-29 RX ADMIN — CETIRIZINE HYDROCHLORIDE 10 MG: 10 TABLET, FILM COATED ORAL at 09:06

## 2024-06-29 NOTE — NURSING
DC instructions reviewed with pt verbalized understanding and all questions answered. P did not want to wait for transport for wheelchair, wanted to walk to ED. Nurse assisted pt in walking to ED where her family had transportation. No distress noted.

## 2024-06-29 NOTE — PROGRESS NOTES
Acute Care Surgery   Progress Note  Admit Date: 6/25/2024  HD#4  POD#1 Day Post-Op    Subjective:   Interval history:  NAEO, AF, HDS, denies abdominal pain  Tolerating soft diet without N/V  Multiple Bms yesterday, passing flatus  Ambulating in room    Home Meds:  Current Outpatient Medications   Medication Instructions    acetaminophen (TYLENOL) 325 mg, Oral, Every 6 hours PRN    ALPRAZolam (XANAX) 1 mg, Nightly    cetirizine (ZYRTEC) 10 mg, Oral, Daily    cyanocobalamin (VITAMIN B-12) 1,000 mcg, Oral    diclofenac sodium (VOLTAREN) 1 % Gel No dose, route, or frequency recorded.    famotidine (PEPCID) 40 mg, Oral    famotidine (PEPCID) 20 mg, 2 times daily PRN    fluticasone propionate (FLONASE) 50 mcg/actuation nasal spray INSTILL ONE SPRAY INTO EACH NOSTRIL ONCE A DAY    HYDROcodone-acetaminophen (NORCO)  mg per tablet No dose, route, or frequency recorded.    LIDOcaine (LIDODERM) 5 % 1 patch, Transdermal, Daily, Remove & Discard patch within 12 hours or as directed by MD    losartan-hydrochlorothiazide 50-12.5 mg (HYZAAR) 50-12.5 mg per tablet No dose, route, or frequency recorded.    omeprazole (PRILOSEC) 20 mg    RESTASIS 0.05 % ophthalmic emulsion 1 drop, Both Eyes, Daily      Scheduled Meds:   aspirin  81 mg Oral Daily    cetirizine  10 mg Oral Daily    cyanocobalamin  1,000 mcg Oral Daily    docusate sodium  50 mg Oral Daily    famotidine  20 mg Oral BID    LIDOcaine (PF) 10 mg/ml (1%)  1 mL Intradermal Once    polyethylene glycol  17 g Oral Daily    scopolamine  1 patch Transdermal Once    senna-docusate 8.6-50 mg  1 tablet Oral BID     Continuous Infusions:   electrolyte-A   Intravenous Continuous 10 mL/hr at 06/28/24 1247 New Bag at 06/28/24 1247     PRN Meds:  Current Facility-Administered Medications:     acetaminophen, 650 mg, Oral, Q4H PRN    aluminum-magnesium hydroxide-simethicone, 30 mL, Oral, QID PRN    dextrose 10%, 12.5 g, Intravenous, PRN    dextrose 10%, 25 g, Intravenous, PRN     "glucagon (human recombinant), 1 mg, Intramuscular, PRN    glucose, 16 g, Oral, PRN    glucose, 24 g, Oral, PRN    indomethacin, 100 mg, Rectal, See admin instructions    melatonin, 6 mg, Oral, Nightly PRN    naloxone, 0.02 mg, Intravenous, PRN    ondansetron, 8 mg, Oral, Q6H PRN    ondansetron, 4 mg, Intravenous, Q6H PRN    oxyCODONE, 5 mg, Oral, Q6H PRN    oxyCODONE, 10 mg, Oral, Q6H PRN    prochlorperazine, 5 mg, Intravenous, Q6H PRN    sodium chloride 0.9%, 10 mL, Intravenous, Q12H PRN     Objective:     VITAL SIGNS: 24 HR MIN & MAX LAST   Temp  Min: 97.7 °F (36.5 °C)  Max: 98.9 °F (37.2 °C)  98.2 °F (36.8 °C)   BP  Min: 120/71  Max: 163/73  (!) 147/66    Pulse  Min: 57  Max: 108  71    Resp  Min: 11  Max: 21  18    SpO2  Min: 95 %  Max: 100 %  97 %      HT: 5' 5" (165.1 cm)  WT: 85.7 kg (189 lb)  BMI: 31.5     Intake/output:  Intake/Output - Last 3 Shifts         06/27 0700 06/28 0659 06/28 0700 06/29 0659 06/29 0700 06/30 0659    P.O.       IV Piggyback  450     Total Intake(mL/kg)  450 (5.3)     Net  +450            Urine Occurrence 3 x              Intake/Output Summary (Last 24 hours) at 6/29/2024 0706  Last data filed at 6/28/2024 1419  Gross per 24 hour   Intake 450 ml   Output --   Net 450 ml           Lines/drains/airway:       Peripheral IV - Single Lumen 06/25/24 1637 20 G Right Antecubital (Active)   Site Assessment Clean;Dry;Intact;No redness;No swelling 06/29/24 0000   Extremity Assessment Distal to IV No abnormal discoloration 06/28/24 0749   Line Status Capped;Saline locked 06/29/24 0000   Dressing Status Clean;Dry;Intact 06/29/24 0000   Dressing Intervention Integrity maintained 06/29/24 0000   Number of days: 3       Physical examination:  Gen: NAD  CV: RR  Resp: NWOB on RA  Abd: Soft, NT, ND  Ext: Moves all extremities well  Neuro: No neuro deficits  Skin/wounds: As above    Labs:  Renal:  Recent Labs     06/26/24  1034 06/27/24  0428 06/28/24  0412   BUN 6.3* 8.4* 11.7   CREATININE 0.65 " "0.64 0.73     No results for input(s): "LACTIC" in the last 72 hours.  FENGI:  Recent Labs     06/26/24  1034 06/27/24  0428 06/28/24  0412    143 139   K 3.9 3.8 3.6   * 106 105   CO2 25 27 22   CALCIUM 9.2 9.8 9.6   MG  --  1.70  --    ALBUMIN 3.8 3.7 3.9   BILITOT 2.6* 6.1* 2.6*   AST 1,210* 1,224* 455*   ALKPHOS 229* 273* 287*   * 814* 651*     Heme:  Recent Labs     06/27/24  0428 06/28/24  0412   HGB 13.1 13.4   HCT 40.6 40.7    250     ID:  Recent Labs     06/27/24  0428 06/28/24  0412   WBC 3.65* 5.64     CBG:  Recent Labs     06/26/24  1034 06/27/24  0428 06/28/24  0412   GLUCOSE 91 91 110*      Cardiovascular:  Recent Labs   Lab 06/25/24  1624 06/25/24  2307 06/26/24  0407 06/26/24  1034 06/27/24  0428   TROPONINI <0.010   < > <0.010 <0.010 <0.010   BNP 12.4  --   --   --   --     < > = values in this interval not displayed.     I have reviewed all pertinent lab results within the past 24 hours.    Imaging:  FL ERCP Biliary And Pancreatic By NMRKT Tech   Final Result      Fluoroscopic assistance provided as above.         Electronically signed by: Devin Burgos   Date:    06/28/2024   Time:    14:45      MRI Abdomen W WO Contrast_INC MRCP (XPD)   Final Result      1. Intra and extrahepatic biliary ductal dilatation with tapering at the pancreatic head but no obstructing lesion seen.   2. Status post cholecystectomy with mild stranding along the gallbladder fossa.         Electronically signed by: Devin Burgos   Date:    06/27/2024   Time:    16:10      NM Hepatobiliary Scan (HIDA)   Final Result      1.  Negative for bile leak.      2.  Lack of biliary to bowel radioisotope labeled bile clearance is consistent with high grade common bile duct obstruction.  This may be secondary to choledocholithiasis.         Electronically signed by: Jonathan Campuzano   Date:    06/27/2024   Time:    12:13      CT Abdomen Pelvis With IV Contrast NO Oral Contrast   Final Result      1. Stranding along the " right hepatic lobe and pericolic gutter likely postsurgical given recent cholecystectomy.   2. Biliary ductal dilatation may be secondary to cholecystectomy but recommend correlation with cholestatic parameters.   3. Question gastritis.         Electronically signed by: Devin Burgos   Date:    06/26/2024   Time:    08:58      CTA Chest Non-Coronary (PE Studies)   Final Result      No pulmonary embolism identified.         Electronically signed by: Henrique Nolasco   Date:    06/25/2024   Time:    19:15      X-Ray Chest 1 View   Final Result      No acute findings.         Electronically signed by: Devin Burgos   Date:    06/25/2024   Time:    16:54         I have reviewed all pertinent imaging results/findings within the past 24 hours.    Micro/Path/Other:  Microbiology Results (last 7 days)       ** No results found for the last 168 hours. **           Pathology Results  (Last 7 days)      None             Assessment & Plan:   58 y.o. female with past medical history of HTN and s/p lap catrachito 6/20 at [facility] who presented yesterday with chest pain. Cardiac work up negative. Patient pain now mostly RUQ and radiates to the back. General surgery consulted for evaluation.     - No indications for surgical intervention at this time. ERCP findings of moderate CBD dilation with stone particles and sludge but no discrete stone removed. Sphincterotomy performed  - Fu further GI recs   - MMPC and anti-emetics PRN    DVT ppx: None    Dispo: Per primary team    Sharif Pierre MD  [employer] General Surgery, PGY-2

## 2024-06-29 NOTE — DISCHARGE SUMMARY
[facility] Hospital Medicine Discharge Summary    Admit Date: 6/25/2024  Discharge Date and Time: 6/29/202411:16 AM  Admitting Physician:  Team  Discharging Physician: Genet Joseph MD.  Primary Care Physician: Anjelica Cardenas FNP  Consults: {consultation: [id number]}    Discharge Diagnoses:  ***    Hospital Course:   ***  Pt was seen and examined on the day of discharge  Vitals:  VITAL SIGNS: 24 HRS MIN & MAX LAST   Temp  Min: 97.7 °F (36.5 °C)  Max: 98.9 °F (37.2 °C) 98.3 °F (36.8 °C)   BP  Min: 120/71  Max: 163/73 124/68   Pulse  Min: 57  Max: 108  80   Resp  Min: 11  Max: 21 18   SpO2  Min: 95 %  Max: 100 % 100 %       Physical Exam:  ***    Procedures Performed: No admission procedures for hospital encounter.     Significant Diagnostic Studies: See Full reports for all details    Recent Labs   Lab 06/26/24 0407 06/27/24 0428 06/28/24 0412   WBC 3.90* 3.65* 5.64   RBC 4.85 5.12 5.13   HGB 12.4 13.1 13.4   HCT 38.5 40.6 40.7   MCV 79.4* 79.3* 79.3*   MCH 25.6* 25.6* 26.1*   MCHC 32.2* 32.3* 32.9*   RDW 16.7 17.0 17.2*    212 250   MPV 10.2 10.1 10.7*       Recent Labs   Lab 06/27/24 0428 06/28/24 0412 06/29/24 0624    139 140   K 3.8 3.6 4.1    105 104   CO2 27 22 25   BUN 8.4* 11.7 11.1   CREATININE 0.64 0.73 0.71   CALCIUM 9.8 9.6 10.0   MG 1.70  --   --    ALBUMIN 3.7 3.9 3.8   ALKPHOS 273* 287* 236*   * 651* 420*   AST 1,224* 455* 150*   BILITOT 6.1* 2.6* 1.5        Microbiology Results (last 7 days)       ** No results found for the last 168 hours. **             FL ERCP Biliary And Pancreatic By Rad Tech  Narrative: EXAMINATION:  FL ERCP BILIARY AND PANCREATIC    CLINICAL HISTORY:  Choledocolithiasis;    FINDINGS:  Fluoroscopic assistance provided during ERCP.  Images were independently interpreted by the attending physician performing the procedure.    Fluoro time 4 minutes 20 seconds.    Reference Air Kerma: 50 mGy.  Impression:  Fluoroscopic assistance provided as above.    Electronically signed by: Devin Burgos  Date:    06/28/2024  Time:    14:45         Medication List        START taking these medications      aspirin 81 MG EC tablet  Commonly known as: ECOTRIN  Take 1 tablet (81 mg total) by mouth once daily.  Start taking on: June 30, 2024     docusate sodium 50 MG capsule  Commonly known as: COLACE  Take 1 capsule (50 mg total) by mouth 2 (two) times daily as needed for Constipation.            CONTINUE taking these medications      cyanocobalamin 1000 MCG tablet  Commonly known as: VITAMIN B-12     diclofenac sodium 1 % Gel  Commonly known as: VOLTAREN     fluticasone propionate 50 mcg/actuation nasal spray  Commonly known as: FLONASE     LIDOcaine 5 %  Commonly known as: LIDODERM  Place 1 patch onto the skin once daily. Remove & Discard patch within 12 hours or as directed by MD     losartan-hydrochlorothiazide 50-12.5 mg 50-12.5 mg per tablet  Commonly known as: HYZAAR     RESTASIS 0.05 % ophthalmic emulsion  Generic drug: cycloSPORINE            STOP taking these medications      acetaminophen 325 MG tablet  Commonly known as: TYLENOL     ALPRAZolam 1 MG tablet  Commonly known as: XANAX     cetirizine 10 MG tablet  Commonly known as: ZYRTEC     famotidine 20 MG tablet  Commonly known as: PEPCID     famotidine 40 MG tablet  Commonly known as: PEPCID     HYDROcodone-acetaminophen  mg per tablet  Commonly known as: NORCO     omeprazole 20 MG capsule  Commonly known as: PRILOSEC               Where to Get Your Medications        These medications were sent to [facility] [address]  [address] [place] [id number]      Phone: [phone]   aspirin 81 MG EC tablet  docusate sodium 50 MG capsule          Explained in detail to the patient about the discharge plan, medications, and follow-up visits. Pt understands and agrees with the treatment plan  Discharge Disposition:      Discharged Condition: stable  Diet-   Dietary Orders (From admission, onward)       Start     Ordered    06/28/24 1658  Diet Low Fiber/Residue  Diet effective now         06/28/24 1657                   Medications Per DC med rec  Activities as tolerated   Follow-up Information       Anjelica Cardenas FNP. Go on 9/20/2024.    Specialty: Family Medicine  Why: 6 month appt schedule September 20th @ 915am  Contact information:  [address] [facility] [address]  [phone]               Stoney Pereyra MD Follow up in 1 week(s).    Specialty: Cardiology  Why: We will call you with a hospital follow up appointment.  Contact information:  [address] [facility] [address]  [phone]               Anjelica Cardenas FNP Follow up in 1 week(s).    Specialty: Family Medicine  Why: Recheck liver enzymes to ensure are trending down  Contact information:  [address] [facility] [address]  [phone]                           For further questions contact hospitalist office    Discharge time 33 minutes    For worsening symptoms, chest pain, shortness of breath, increased abdominal pain, high grade fever, stroke or stroke like symptoms, immediately go to the nearest Emergency Room or call 911 as soon as possible.      Genet Yusuf M.D, on 6/29/2024. at 11:16 AM.

## 2024-06-30 NOTE — ANESTHESIA POSTPROCEDURE EVALUATION
Anesthesia Post Evaluation    Patient: Renetta Cornejo    Procedure(s) Performed: Procedure(s) (LRB):  ERCP (N/A)  ERCP, WITH SPHINCTEROTOMY    Final Anesthesia Type: general      Patient location during evaluation: GI PACU  Patient participation: Yes- Able to Participate  Level of consciousness: awake and alert  Post-procedure vital signs: reviewed and stable  Pain management: adequate  Airway patency: patent    PONV status at discharge: No PONV  Anesthetic complications: no      Cardiovascular status: blood pressure returned to baseline  Respiratory status: spontaneous ventilation and room air  Hydration status: euvolemic  Follow-up not needed.              Vitals Value Taken Time   /77 06/28/24 1448   Temp  06/29/24 1902   Pulse 69 06/28/24 1448   Resp 16 06/28/24 1448   SpO2 100 % 06/28/24 1448         No case tracking events are documented in the log.      Pain/Tete Score: Pain Rating Post Med Admin: 3 (6/28/2024  2:52 PM)  Tete Score: 10 (6/28/2024  2:48 PM)

## 2024-07-02 ENCOUNTER — PATIENT MESSAGE (OUTPATIENT)
Dept: ADMINISTRATIVE | Facility: OTHER | Age: 59
End: 2024-07-02
Payer: MEDICARE

## 2024-11-08 ENCOUNTER — LAB VISIT (OUTPATIENT)
Dept: LAB | Facility: HOSPITAL | Age: 59
End: 2024-11-08
Attending: INTERNAL MEDICINE
Payer: MEDICARE

## 2024-11-08 DIAGNOSIS — Z11.59 SCREENING EXAMINATION FOR POLIOMYELITIS: ICD-10-CM

## 2024-11-08 DIAGNOSIS — M79.7 SCAPULOHUMERAL FIBROSITIS: ICD-10-CM

## 2024-11-08 DIAGNOSIS — M19.90 SENILE ARTHRITIS: ICD-10-CM

## 2024-11-08 DIAGNOSIS — E55.9 AVITAMINOSIS D: ICD-10-CM

## 2024-11-08 DIAGNOSIS — E07.9 DISEASE OF THYROID GLAND: Primary | ICD-10-CM

## 2024-11-08 LAB
25(OH)D3+25(OH)D2 SERPL-MCNC: 44 NG/ML (ref 30–80)
ALBUMIN SERPL-MCNC: 4 G/DL (ref 3.5–5)
ALBUMIN/GLOB SERPL: 1 RATIO (ref 1.1–2)
ALP SERPL-CCNC: 153 UNIT/L (ref 40–150)
ALT SERPL-CCNC: 9 UNIT/L (ref 0–55)
ANION GAP SERPL CALC-SCNC: 9 MEQ/L
AST SERPL-CCNC: 14 UNIT/L (ref 5–34)
BACTERIA #/AREA URNS AUTO: ABNORMAL /HPF
BASOPHILS # BLD AUTO: 0.02 X10(3)/MCL
BASOPHILS NFR BLD AUTO: 0.3 %
BILIRUB SERPL-MCNC: 0.4 MG/DL
BILIRUB UR QL STRIP.AUTO: NEGATIVE
BUN SERPL-MCNC: 11.6 MG/DL (ref 9.8–20.1)
CALCIUM SERPL-MCNC: 9.7 MG/DL (ref 8.4–10.2)
CHLORIDE SERPL-SCNC: 106 MMOL/L (ref 98–107)
CLARITY UR: CLEAR
CO2 SERPL-SCNC: 27 MMOL/L (ref 22–29)
COLOR UR AUTO: NORMAL
CREAT SERPL-MCNC: 0.78 MG/DL (ref 0.55–1.02)
CREAT/UREA NIT SERPL: 15
EOSINOPHIL # BLD AUTO: 0.14 X10(3)/MCL (ref 0–0.9)
EOSINOPHIL NFR BLD AUTO: 2.2 %
ERYTHROCYTE [DISTWIDTH] IN BLOOD BY AUTOMATED COUNT: 15.5 % (ref 11.5–17)
GFR SERPLBLD CREATININE-BSD FMLA CKD-EPI: >60 ML/MIN/1.73/M2
GLOBULIN SER-MCNC: 4.2 GM/DL (ref 2.4–3.5)
GLUCOSE SERPL-MCNC: 84 MG/DL (ref 74–100)
GLUCOSE UR QL STRIP: NEGATIVE
HBV SURFACE AG SERPL QL IA: NONREACTIVE
HCT VFR BLD AUTO: 43.4 % (ref 37–47)
HCV AB SERPL QL IA: REACTIVE
HGB BLD-MCNC: 14 G/DL (ref 12–16)
HGB UR QL STRIP: NEGATIVE
IMM GRANULOCYTES # BLD AUTO: 0.01 X10(3)/MCL (ref 0–0.04)
IMM GRANULOCYTES NFR BLD AUTO: 0.2 %
KETONES UR QL STRIP: NEGATIVE
LEUKOCYTE ESTERASE UR QL STRIP: NEGATIVE
LYMPHOCYTES # BLD AUTO: 2.37 X10(3)/MCL (ref 0.6–4.6)
LYMPHOCYTES NFR BLD AUTO: 36.7 %
MCH RBC QN AUTO: 26.2 PG (ref 27–31)
MCHC RBC AUTO-ENTMCNC: 32.3 G/DL (ref 33–36)
MCV RBC AUTO: 81.3 FL (ref 80–94)
MONOCYTES # BLD AUTO: 0.61 X10(3)/MCL (ref 0.1–1.3)
MONOCYTES NFR BLD AUTO: 9.5 %
NEUTROPHILS # BLD AUTO: 3.3 X10(3)/MCL (ref 2.1–9.2)
NEUTROPHILS NFR BLD AUTO: 51.1 %
NITRITE UR QL STRIP: NEGATIVE
PH UR STRIP: 7 [PH]
PLATELET # BLD AUTO: 256 X10(3)/MCL (ref 130–400)
PMV BLD AUTO: 9.8 FL (ref 7.4–10.4)
POTASSIUM SERPL-SCNC: 3.6 MMOL/L (ref 3.5–5.1)
PROT SERPL-MCNC: 8.2 GM/DL (ref 6.4–8.3)
PROT UR QL STRIP: NEGATIVE
RBC # BLD AUTO: 5.34 X10(6)/MCL (ref 4.2–5.4)
RBC #/AREA URNS AUTO: ABNORMAL /HPF
SODIUM SERPL-SCNC: 142 MMOL/L (ref 136–145)
SP GR UR STRIP.AUTO: 1.02 (ref 1–1.03)
SQUAMOUS #/AREA URNS AUTO: ABNORMAL /HPF
T4 FREE SERPL-MCNC: 0.88 NG/DL (ref 0.7–1.48)
TSH SERPL-ACNC: 1.5 UIU/ML (ref 0.35–4.94)
UROBILINOGEN UR STRIP-ACNC: 1
WBC # BLD AUTO: 6.45 X10(3)/MCL (ref 4.5–11.5)
WBC #/AREA URNS AUTO: ABNORMAL /HPF

## 2024-11-08 PROCEDURE — 86480 TB TEST CELL IMMUN MEASURE: CPT

## 2024-11-08 PROCEDURE — 86431 RHEUMATOID FACTOR QUANT: CPT

## 2024-11-08 PROCEDURE — 84443 ASSAY THYROID STIM HORMONE: CPT

## 2024-11-08 PROCEDURE — 81003 URINALYSIS AUTO W/O SCOPE: CPT

## 2024-11-08 PROCEDURE — 86039 ANTINUCLEAR ANTIBODIES (ANA): CPT

## 2024-11-08 PROCEDURE — 87340 HEPATITIS B SURFACE AG IA: CPT

## 2024-11-08 PROCEDURE — 82306 VITAMIN D 25 HYDROXY: CPT

## 2024-11-08 PROCEDURE — 85025 COMPLETE CBC W/AUTO DIFF WBC: CPT

## 2024-11-08 PROCEDURE — 86225 DNA ANTIBODY NATIVE: CPT

## 2024-11-08 PROCEDURE — 84439 ASSAY OF FREE THYROXINE: CPT

## 2024-11-08 PROCEDURE — 86200 CCP ANTIBODY: CPT | Mod: 59

## 2024-11-08 PROCEDURE — 36415 COLL VENOUS BLD VENIPUNCTURE: CPT

## 2024-11-08 PROCEDURE — 86803 HEPATITIS C AB TEST: CPT

## 2024-11-08 PROCEDURE — 80053 COMPREHEN METABOLIC PANEL: CPT

## 2024-11-09 LAB — ANA SER QL HEP2 SUBST: NORMAL

## 2024-11-11 LAB
CYCLIC CITRULLINATED PEPTIDE (CCP) (OHS): 1.2 U/ML
CYCLIC CITRULLINATED PEPTIDE (CCP) (OHS): 2.2 U/ML
DSDNA AB QUANT (OHS): 1.1 IU/ML
RHEUMATOID FACTOR IGA QUANTITATIVE (OLG): 3.2 IU/ML
RHEUMATOID FACTOR IGM QUANTITATIVE (OLG): 3.4 IU/ML

## 2024-11-12 LAB
GAMMA INTERFERON BACKGROUND BLD IA-ACNC: 0.02 IU/ML
M TB IFN-G BLD-IMP: NEGATIVE
M TB IFN-G CD4+ BCKGRND COR BLD-ACNC: -0.01 IU/ML
M TB IFN-G CD4+CD8+ BCKGRND COR BLD-ACNC: -0.01 IU/ML
MITOGEN IGNF BCKGRD COR BLD-ACNC: 6.85 IU/ML

## 2025-03-24 DIAGNOSIS — R07.9 CHEST PAIN, UNSPECIFIED: Primary | ICD-10-CM

## 2025-03-24 DIAGNOSIS — I87.2 CHRONIC VENOUS INSUFFICIENCY: ICD-10-CM

## 2025-03-24 DIAGNOSIS — Z82.49 FAMILY HISTORY OF ISCHEMIC HEART DISEASE: ICD-10-CM

## 2025-04-06 ENCOUNTER — LAB VISIT (OUTPATIENT)
Dept: LAB | Facility: HOSPITAL | Age: 60
End: 2025-04-06
Attending: INTERNAL MEDICINE
Payer: MEDICARE

## 2025-04-06 DIAGNOSIS — R07.9 CHEST PAIN, UNSPECIFIED TYPE: ICD-10-CM

## 2025-04-06 DIAGNOSIS — R07.9 CHEST PAIN, UNSPECIFIED TYPE: Primary | ICD-10-CM

## 2025-04-06 LAB
CHOLEST SERPL-MCNC: 155 MG/DL
CHOLEST/HDLC SERPL: 3 {RATIO} (ref 0–5)
HDLC SERPL-MCNC: 48 MG/DL (ref 35–60)
LDLC SERPL CALC-MCNC: 93 MG/DL (ref 50–140)
TRIGL SERPL-MCNC: 69 MG/DL (ref 37–140)
VLDLC SERPL CALC-MCNC: 14 MG/DL

## 2025-04-06 PROCEDURE — 80061 LIPID PANEL: CPT

## 2025-04-06 PROCEDURE — 36415 COLL VENOUS BLD VENIPUNCTURE: CPT

## 2025-05-23 ENCOUNTER — HOSPITAL ENCOUNTER (OUTPATIENT)
Dept: CARDIOLOGY | Facility: HOSPITAL | Age: 60
Discharge: HOME | End: 2025-05-23
Attending: INTERNAL MEDICINE
Payer: MEDICARE

## 2025-05-23 ENCOUNTER — HOSPITAL ENCOUNTER (OUTPATIENT)
Dept: RADIOLOGY | Facility: HOSPITAL | Age: 60
Discharge: HOME | End: 2025-05-23
Attending: INTERNAL MEDICINE
Payer: MEDICARE

## 2025-05-23 DIAGNOSIS — R07.9 CHEST PAIN, UNSPECIFIED: ICD-10-CM

## 2025-05-23 DIAGNOSIS — Z82.49 FAMILY HISTORY OF ISCHEMIC HEART DISEASE: ICD-10-CM

## 2025-05-23 DIAGNOSIS — I87.2 CHRONIC VENOUS INSUFFICIENCY: ICD-10-CM

## 2025-05-23 LAB
OHS CV CPX 85 PERCENT MAX PREDICTED HEART RATE MALE: 138
OHS CV CPX MAX PREDICTED HEART RATE: 162
OHS CV CPX PATIENT IS FEMALE: 1
OHS CV CPX PATIENT IS MALE: 0
OHS CV INITIAL DOSE: 10.8 MCG/KG/MIN
OHS CV PEAK DOSE: 30.3 MCG/KG/MIN

## 2025-05-23 PROCEDURE — A9500 TC99M SESTAMIBI: HCPCS | Performed by: INTERNAL MEDICINE

## 2025-05-23 PROCEDURE — 63600175 PHARM REV CODE 636 W HCPCS: Performed by: INTERNAL MEDICINE

## 2025-05-23 PROCEDURE — 78452 HT MUSCLE IMAGE SPECT MULT: CPT

## 2025-05-23 PROCEDURE — 93017 CV STRESS TEST TRACING ONLY: CPT

## 2025-05-23 RX ORDER — TETRAKIS(2-METHOXYISOBUTYLISOCYANIDE)COPPER(I) TETRAFLUOROBORATE 1 MG/ML
10.8 INJECTION, POWDER, LYOPHILIZED, FOR SOLUTION INTRAVENOUS
Status: COMPLETED | OUTPATIENT
Start: 2025-05-23 | End: 2025-05-23

## 2025-05-23 RX ORDER — REGADENOSON 0.08 MG/ML
0.4 INJECTION, SOLUTION INTRAVENOUS
Status: COMPLETED | OUTPATIENT
Start: 2025-05-23 | End: 2025-05-23

## 2025-05-23 RX ORDER — TETRAKIS(2-METHOXYISOBUTYLISOCYANIDE)COPPER(I) TETRAFLUOROBORATE 1 MG/ML
30.3 INJECTION, POWDER, LYOPHILIZED, FOR SOLUTION INTRAVENOUS
Status: COMPLETED | OUTPATIENT
Start: 2025-05-23 | End: 2025-05-23

## 2025-05-23 RX ADMIN — REGADENOSON 0.4 MG: 0.08 INJECTION, SOLUTION INTRAVENOUS at 09:05

## 2025-05-23 RX ADMIN — KIT FOR THE PREPARATION OF TECHNETIUM TC99M SESTAMIBI 10.8 MILLICURIE: 1 INJECTION, POWDER, LYOPHILIZED, FOR SOLUTION PARENTERAL at 08:05

## 2025-05-23 RX ADMIN — KIT FOR THE PREPARATION OF TECHNETIUM TC99M SESTAMIBI 30.3 MILLICURIE: 1 INJECTION, POWDER, LYOPHILIZED, FOR SOLUTION PARENTERAL at 09:05

## (undated) DEVICE — ELECTRODE REM PLYHSV RETURN 9

## (undated) DEVICE — TIP SUCTION YANKAUER

## (undated) DEVICE — BLOCK BLOX BITE DENT RIM 54FR

## (undated) DEVICE — SPHINCTEROTOME AUTOTOME RX 44

## (undated) DEVICE — SUPPORT ULNA NERVE PROTECTOR

## (undated) DEVICE — EXTRACTOR PRO 9-12MM ABOVE

## (undated) DEVICE — DEVICE LOCKING RX - OLYMPUS

## (undated) DEVICE — GUIDEWIRE ENDO .025FR 260X5CM

## (undated) DEVICE — KIT SURGICAL COLON .25 1.1OZ

## (undated) DEVICE — COLLECTION SPECIMEN NEPTUNE

## (undated) DEVICE — SOL IRRI STRL WATER 1000ML